# Patient Record
Sex: MALE | Race: WHITE | NOT HISPANIC OR LATINO | Employment: FULL TIME | URBAN - METROPOLITAN AREA
[De-identification: names, ages, dates, MRNs, and addresses within clinical notes are randomized per-mention and may not be internally consistent; named-entity substitution may affect disease eponyms.]

---

## 2018-10-23 ENCOUNTER — IMMUNIZATION (OUTPATIENT)
Dept: SOCIAL WORK | Facility: CLINIC | Age: 53
End: 2018-10-23
Payer: COMMERCIAL

## 2018-10-23 DIAGNOSIS — Z23 NEED FOR VACCINATION: ICD-10-CM

## 2018-10-23 PROCEDURE — 90471 IMMUNIZATION ADMIN: CPT | Performed by: REGISTERED NURSE

## 2018-10-23 PROCEDURE — 90686 IIV4 VACC NO PRSV 0.5 ML IM: CPT | Performed by: REGISTERED NURSE

## 2020-12-17 DIAGNOSIS — Z23 NEED FOR VACCINATION: ICD-10-CM

## 2020-12-20 ENCOUNTER — APPOINTMENT (OUTPATIENT)
Dept: FAMILY PLANNING/WOMEN'S HEALTH CLINIC | Facility: IMMUNIZATION CENTER | Age: 55
End: 2020-12-20
Attending: FAMILY MEDICINE
Payer: COMMERCIAL

## 2020-12-20 DIAGNOSIS — Z23 NEED FOR VACCINATION: ICD-10-CM

## 2020-12-20 PROCEDURE — 0001A PFIZER SARS-COV-2 VACCINE: CPT

## 2020-12-20 PROCEDURE — 91300 PFIZER SARS-COV-2 VACCINE: CPT

## 2020-12-21 ENCOUNTER — IMMUNIZATION (OUTPATIENT)
Dept: FAMILY PLANNING/WOMEN'S HEALTH CLINIC | Facility: IMMUNIZATION CENTER | Age: 55
End: 2020-12-21
Payer: COMMERCIAL

## 2020-12-21 DIAGNOSIS — Z23 ENCOUNTER FOR VACCINATION: Primary | ICD-10-CM

## 2021-01-10 ENCOUNTER — IMMUNIZATION (OUTPATIENT)
Dept: FAMILY PLANNING/WOMEN'S HEALTH CLINIC | Facility: IMMUNIZATION CENTER | Age: 56
End: 2021-01-10
Attending: FAMILY MEDICINE
Payer: COMMERCIAL

## 2021-01-10 DIAGNOSIS — Z23 ENCOUNTER FOR VACCINATION: Primary | ICD-10-CM

## 2021-01-10 PROCEDURE — 0002A PFIZER SARS-COV-2 VACCINE: CPT

## 2021-01-10 PROCEDURE — 91300 PFIZER SARS-COV-2 VACCINE: CPT

## 2021-06-21 ENCOUNTER — APPOINTMENT (OUTPATIENT)
Dept: SLEEP MEDICINE | Facility: MEDICAL CENTER | Age: 56
End: 2021-06-21
Payer: COMMERCIAL

## 2022-09-06 ENCOUNTER — APPOINTMENT (RX ONLY)
Dept: URBAN - METROPOLITAN AREA CLINIC 4 | Facility: CLINIC | Age: 57
Setting detail: DERMATOLOGY
End: 2022-09-06

## 2022-09-06 DIAGNOSIS — L57.0 ACTINIC KERATOSIS: ICD-10-CM

## 2022-09-06 DIAGNOSIS — L81.4 OTHER MELANIN HYPERPIGMENTATION: ICD-10-CM

## 2022-09-06 PROBLEM — D48.5 NEOPLASM OF UNCERTAIN BEHAVIOR OF SKIN: Status: ACTIVE | Noted: 2022-09-06

## 2022-09-06 PROCEDURE — ? BIOPSY BY SHAVE METHOD

## 2022-09-06 PROCEDURE — 11103 TANGNTL BX SKIN EA SEP/ADDL: CPT

## 2022-09-06 PROCEDURE — 11102 TANGNTL BX SKIN SINGLE LES: CPT

## 2022-09-06 PROCEDURE — ? ORDER FOR PHOTODYNAMIC THERAPY

## 2022-09-06 PROCEDURE — 99203 OFFICE O/P NEW LOW 30 MIN: CPT | Mod: 25

## 2022-09-06 PROCEDURE — ? COUNSELING

## 2022-09-06 ASSESSMENT — LOCATION ZONE DERM
LOCATION ZONE: FACE
LOCATION ZONE: ARM
LOCATION ZONE: FACE

## 2022-09-06 ASSESSMENT — LOCATION DETAILED DESCRIPTION DERM
LOCATION DETAILED: RIGHT ANTERIOR PROXIMAL UPPER ARM
LOCATION DETAILED: RIGHT PROXIMAL POSTERIOR UPPER ARM
LOCATION DETAILED: LEFT SUPERIOR LATERAL MALAR CHEEK
LOCATION DETAILED: RIGHT CENTRAL MALAR CHEEK
LOCATION DETAILED: LEFT PROXIMAL POSTERIOR UPPER ARM
LOCATION DETAILED: LEFT ANTERIOR PROXIMAL UPPER ARM

## 2022-09-06 ASSESSMENT — LOCATION SIMPLE DESCRIPTION DERM
LOCATION SIMPLE: RIGHT CHEEK
LOCATION SIMPLE: LEFT UPPER ARM
LOCATION SIMPLE: LEFT CHEEK
LOCATION SIMPLE: RIGHT UPPER ARM

## 2022-09-06 NOTE — PROCEDURE: ORDER FOR PHOTODYNAMIC THERAPY
Back Incubation Time: 2 Hours
Face And Ears Incubation Time: 1 Hour
Debridement: No
Photosensitizer: Ameluz
Pdt Type: Red Light
Location: Face
Detail Level: Simple
Consent: The procedure and risks were reviewed with the patient including but not limited to: burning, pigmentary changes, pain, blistering, scabbing, redness, and the possibility of needing numerous treatments. Strict photoprotection after the procedure was also discussed.
Face And Scalp Incubation Time: 1 Hour for the face and 2 Hours for the scalp
Frequency Of Pdt: Single Treatment
History Of Hsv (Optional): Yes

## 2022-09-06 NOTE — PROCEDURE: COUNSELING
Detail Level: Zone
Sunscreen Recommendations: Recommend sunscreen daily, or sun protective clothing.  I recommend a zinc or titanium based sunscreen with a spf of 30 or greater.

## 2022-09-22 ENCOUNTER — APPOINTMENT (RX ONLY)
Dept: URBAN - METROPOLITAN AREA CLINIC 4 | Facility: CLINIC | Age: 57
Setting detail: DERMATOLOGY
End: 2022-09-22

## 2022-09-22 ENCOUNTER — PRE-ADMISSION TESTING (OUTPATIENT)
Dept: ADMISSIONS | Facility: MEDICAL CENTER | Age: 57
End: 2022-09-22
Attending: SURGERY
Payer: COMMERCIAL

## 2022-09-22 DIAGNOSIS — Z01.812 PRE-OPERATIVE LABORATORY EXAMINATION: ICD-10-CM

## 2022-09-22 PROBLEM — C44.719 BASAL CELL CARCINOMA OF SKIN OF LEFT LOWER LIMB, INCLUDING HIP: Status: ACTIVE | Noted: 2022-09-22

## 2022-09-22 PROCEDURE — 12032 INTMD RPR S/A/T/EXT 2.6-7.5: CPT

## 2022-09-22 PROCEDURE — ? EXCISION

## 2022-09-22 PROCEDURE — ? COUNSELING

## 2022-09-22 PROCEDURE — ? ADDITIONAL NOTES

## 2022-09-22 PROCEDURE — 11602 EXC TR-EXT MAL+MARG 1.1-2 CM: CPT

## 2022-09-22 RX ORDER — LISINOPRIL AND HYDROCHLOROTHIAZIDE 12.5; 1 MG/1; MG/1
1 TABLET ORAL EVERY MORNING
COMMUNITY

## 2022-09-22 RX ORDER — TRAVOPROST OPHTHALMIC SOLUTION 0.04 MG/ML
1 SOLUTION OPHTHALMIC DAILY
COMMUNITY

## 2022-09-22 NOTE — PROCEDURE: EXCISION

## 2022-09-22 NOTE — HPI: SKIN LESION (BASAL CELL CARCINOMA)
How Severe Is Your Skin Cancer?: mild
Is This A New Presentation, Or A Follow-Up?: Follow Up Basal Cell Carcinoma
When Was Basal Cell Biopsied? (Optional): 9/8/22
Accession # (Optional): [id number]

## 2022-09-22 NOTE — PROCEDURE: ADDITIONAL NOTES
Detail Level: Detailed
Additional Notes: We discussed options for treatment and after discussion, patient wanted to proceed with excision.
Render Risk Assessment In Note?: no

## 2022-09-23 ENCOUNTER — PRE-ADMISSION TESTING (OUTPATIENT)
Dept: ADMISSIONS | Facility: MEDICAL CENTER | Age: 57
End: 2022-09-23
Attending: SURGERY
Payer: COMMERCIAL

## 2022-09-23 DIAGNOSIS — Z01.812 PRE-OPERATIVE LABORATORY EXAMINATION: ICD-10-CM

## 2022-09-23 LAB
ALBUMIN SERPL BCP-MCNC: 4.7 G/DL (ref 3.2–4.9)
ALBUMIN/GLOB SERPL: 2 G/DL
ALP SERPL-CCNC: 68 U/L (ref 30–99)
ALT SERPL-CCNC: 12 U/L (ref 2–50)
ANION GAP SERPL CALC-SCNC: 10 MMOL/L (ref 7–16)
AST SERPL-CCNC: 16 U/L (ref 12–45)
BASOPHILS # BLD AUTO: 0.4 % (ref 0–1.8)
BASOPHILS # BLD: 0.03 K/UL (ref 0–0.12)
BILIRUB SERPL-MCNC: 1.1 MG/DL (ref 0.1–1.5)
BUN SERPL-MCNC: 17 MG/DL (ref 8–22)
CALCIUM SERPL-MCNC: 9.5 MG/DL (ref 8.5–10.5)
CHLORIDE SERPL-SCNC: 103 MMOL/L (ref 96–112)
CO2 SERPL-SCNC: 25 MMOL/L (ref 20–33)
CREAT SERPL-MCNC: 1.05 MG/DL (ref 0.5–1.4)
EOSINOPHIL # BLD AUTO: 0.24 K/UL (ref 0–0.51)
EOSINOPHIL NFR BLD: 2.9 % (ref 0–6.9)
ERYTHROCYTE [DISTWIDTH] IN BLOOD BY AUTOMATED COUNT: 40.7 FL (ref 35.9–50)
GFR SERPLBLD CREATININE-BSD FMLA CKD-EPI: 82 ML/MIN/1.73 M 2
GLOBULIN SER CALC-MCNC: 2.4 G/DL (ref 1.9–3.5)
GLUCOSE SERPL-MCNC: 85 MG/DL (ref 65–99)
HCT VFR BLD AUTO: 43.1 % (ref 42–52)
HGB BLD-MCNC: 15.1 G/DL (ref 14–18)
IMM GRANULOCYTES # BLD AUTO: 0.03 K/UL (ref 0–0.11)
IMM GRANULOCYTES NFR BLD AUTO: 0.4 % (ref 0–0.9)
LYMPHOCYTES # BLD AUTO: 2.82 K/UL (ref 1–4.8)
LYMPHOCYTES NFR BLD: 33.7 % (ref 22–41)
MCH RBC QN AUTO: 32.1 PG (ref 27–33)
MCHC RBC AUTO-ENTMCNC: 35 G/DL (ref 33.7–35.3)
MCV RBC AUTO: 91.7 FL (ref 81.4–97.8)
MONOCYTES # BLD AUTO: 0.54 K/UL (ref 0–0.85)
MONOCYTES NFR BLD AUTO: 6.4 % (ref 0–13.4)
NEUTROPHILS # BLD AUTO: 4.72 K/UL (ref 1.82–7.42)
NEUTROPHILS NFR BLD: 56.2 % (ref 44–72)
NRBC # BLD AUTO: 0 K/UL
NRBC BLD-RTO: 0 /100 WBC
PLATELET # BLD AUTO: 255 K/UL (ref 164–446)
PMV BLD AUTO: 9.9 FL (ref 9–12.9)
POTASSIUM SERPL-SCNC: 3.9 MMOL/L (ref 3.6–5.5)
PROT SERPL-MCNC: 7.1 G/DL (ref 6–8.2)
RBC # BLD AUTO: 4.7 M/UL (ref 4.7–6.1)
SODIUM SERPL-SCNC: 138 MMOL/L (ref 135–145)
WBC # BLD AUTO: 8.4 K/UL (ref 4.8–10.8)

## 2022-09-23 PROCEDURE — 36415 COLL VENOUS BLD VENIPUNCTURE: CPT

## 2022-09-23 PROCEDURE — 85025 COMPLETE CBC W/AUTO DIFF WBC: CPT

## 2022-09-23 PROCEDURE — 80053 COMPREHEN METABOLIC PANEL: CPT

## 2022-09-26 ENCOUNTER — HOSPITAL ENCOUNTER (OUTPATIENT)
Facility: MEDICAL CENTER | Age: 57
End: 2022-09-26
Attending: SURGERY | Admitting: SURGERY
Payer: COMMERCIAL

## 2022-09-26 ENCOUNTER — ANESTHESIA (OUTPATIENT)
Dept: SURGERY | Facility: MEDICAL CENTER | Age: 57
End: 2022-09-26
Payer: COMMERCIAL

## 2022-09-26 ENCOUNTER — ANESTHESIA EVENT (OUTPATIENT)
Dept: SURGERY | Facility: MEDICAL CENTER | Age: 57
End: 2022-09-26
Payer: COMMERCIAL

## 2022-09-26 VITALS
HEIGHT: 73 IN | TEMPERATURE: 96.9 F | DIASTOLIC BLOOD PRESSURE: 90 MMHG | RESPIRATION RATE: 18 BRPM | WEIGHT: 256.84 LBS | SYSTOLIC BLOOD PRESSURE: 156 MMHG | BODY MASS INDEX: 34.04 KG/M2 | OXYGEN SATURATION: 97 % | HEART RATE: 47 BPM

## 2022-09-26 DIAGNOSIS — G89.18 POSTOPERATIVE PAIN: ICD-10-CM

## 2022-09-26 PROBLEM — C43.59 MELANOMA OF BACK (HCC): Status: ACTIVE | Noted: 2022-09-26

## 2022-09-26 LAB — EKG IMPRESSION: NORMAL

## 2022-09-26 PROCEDURE — 160036 HCHG PACU - EA ADDL 30 MINS PHASE I: Performed by: SURGERY

## 2022-09-26 PROCEDURE — 700101 HCHG RX REV CODE 250: Performed by: ANESTHESIOLOGY

## 2022-09-26 PROCEDURE — 700105 HCHG RX REV CODE 258: Performed by: SURGERY

## 2022-09-26 PROCEDURE — 700101 HCHG RX REV CODE 250: Performed by: SURGERY

## 2022-09-26 PROCEDURE — 93005 ELECTROCARDIOGRAM TRACING: CPT | Performed by: SURGERY

## 2022-09-26 PROCEDURE — A9270 NON-COVERED ITEM OR SERVICE: HCPCS | Performed by: ANESTHESIOLOGY

## 2022-09-26 PROCEDURE — 160009 HCHG ANES TIME/MIN: Performed by: SURGERY

## 2022-09-26 PROCEDURE — 700111 HCHG RX REV CODE 636 W/ 250 OVERRIDE (IP)

## 2022-09-26 PROCEDURE — 88342 IMHCHEM/IMCYTCHM 1ST ANTB: CPT

## 2022-09-26 PROCEDURE — 93010 ELECTROCARDIOGRAM REPORT: CPT | Performed by: INTERNAL MEDICINE

## 2022-09-26 PROCEDURE — 88305 TISSUE EXAM BY PATHOLOGIST: CPT

## 2022-09-26 PROCEDURE — 00300 ANES ALL PX INTEG H/N/PTRUNK: CPT | Performed by: ANESTHESIOLOGY

## 2022-09-26 PROCEDURE — 160046 HCHG PACU - 1ST 60 MINS PHASE II: Performed by: SURGERY

## 2022-09-26 PROCEDURE — 700111 HCHG RX REV CODE 636 W/ 250 OVERRIDE (IP): Performed by: ANESTHESIOLOGY

## 2022-09-26 PROCEDURE — 160028 HCHG SURGERY MINUTES - 1ST 30 MINS LEVEL 3: Performed by: SURGERY

## 2022-09-26 PROCEDURE — 160002 HCHG RECOVERY MINUTES (STAT): Performed by: SURGERY

## 2022-09-26 PROCEDURE — 160025 RECOVERY II MINUTES (STATS): Performed by: SURGERY

## 2022-09-26 PROCEDURE — 700102 HCHG RX REV CODE 250 W/ 637 OVERRIDE(OP): Performed by: ANESTHESIOLOGY

## 2022-09-26 PROCEDURE — 160039 HCHG SURGERY MINUTES - EA ADDL 1 MIN LEVEL 3: Performed by: SURGERY

## 2022-09-26 PROCEDURE — 160035 HCHG PACU - 1ST 60 MINS PHASE I: Performed by: SURGERY

## 2022-09-26 PROCEDURE — 160048 HCHG OR STATISTICAL LEVEL 1-5: Performed by: SURGERY

## 2022-09-26 RX ORDER — TRAMADOL HYDROCHLORIDE 50 MG/1
50 TABLET ORAL EVERY 4 HOURS PRN
Qty: 12 TABLET | Refills: 0 | Status: SHIPPED | OUTPATIENT
Start: 2022-09-26 | End: 2022-09-29

## 2022-09-26 RX ORDER — HYDROMORPHONE HYDROCHLORIDE 1 MG/ML
0.2 INJECTION, SOLUTION INTRAMUSCULAR; INTRAVENOUS; SUBCUTANEOUS
Status: DISCONTINUED | OUTPATIENT
Start: 2022-09-26 | End: 2022-09-26 | Stop reason: HOSPADM

## 2022-09-26 RX ORDER — HALOPERIDOL 5 MG/ML
1 INJECTION INTRAMUSCULAR
Status: DISCONTINUED | OUTPATIENT
Start: 2022-09-26 | End: 2022-09-26 | Stop reason: HOSPADM

## 2022-09-26 RX ORDER — LIDOCAINE HYDROCHLORIDE 10 MG/ML
INJECTION, SOLUTION EPIDURAL; INFILTRATION; INTRACAUDAL; PERINEURAL
Status: COMPLETED
Start: 2022-09-26 | End: 2022-09-26

## 2022-09-26 RX ORDER — OXYCODONE HCL 5 MG/5 ML
10 SOLUTION, ORAL ORAL
Status: COMPLETED | OUTPATIENT
Start: 2022-09-26 | End: 2022-09-26

## 2022-09-26 RX ORDER — CELECOXIB 200 MG/1
400 CAPSULE ORAL ONCE
Status: COMPLETED | OUTPATIENT
Start: 2022-09-26 | End: 2022-09-26

## 2022-09-26 RX ORDER — ALBUTEROL SULFATE 2.5 MG/3ML
2.5 SOLUTION RESPIRATORY (INHALATION)
Status: DISCONTINUED | OUTPATIENT
Start: 2022-09-26 | End: 2022-09-26 | Stop reason: HOSPADM

## 2022-09-26 RX ORDER — MIDAZOLAM HYDROCHLORIDE 1 MG/ML
INJECTION INTRAMUSCULAR; INTRAVENOUS PRN
Status: DISCONTINUED | OUTPATIENT
Start: 2022-09-26 | End: 2022-09-26 | Stop reason: SURG

## 2022-09-26 RX ORDER — HYDROMORPHONE HYDROCHLORIDE 1 MG/ML
0.1 INJECTION, SOLUTION INTRAMUSCULAR; INTRAVENOUS; SUBCUTANEOUS
Status: DISCONTINUED | OUTPATIENT
Start: 2022-09-26 | End: 2022-09-26 | Stop reason: HOSPADM

## 2022-09-26 RX ORDER — LABETALOL HYDROCHLORIDE 5 MG/ML
5 INJECTION, SOLUTION INTRAVENOUS
Status: DISCONTINUED | OUTPATIENT
Start: 2022-09-26 | End: 2022-09-26 | Stop reason: HOSPADM

## 2022-09-26 RX ORDER — CEFAZOLIN SODIUM 1 G/3ML
INJECTION, POWDER, FOR SOLUTION INTRAMUSCULAR; INTRAVENOUS PRN
Status: DISCONTINUED | OUTPATIENT
Start: 2022-09-26 | End: 2022-09-26 | Stop reason: SURG

## 2022-09-26 RX ORDER — OXYCODONE HCL 5 MG/5 ML
5 SOLUTION, ORAL ORAL
Status: COMPLETED | OUTPATIENT
Start: 2022-09-26 | End: 2022-09-26

## 2022-09-26 RX ORDER — DEXAMETHASONE SODIUM PHOSPHATE 4 MG/ML
INJECTION, SOLUTION INTRA-ARTICULAR; INTRALESIONAL; INTRAMUSCULAR; INTRAVENOUS; SOFT TISSUE PRN
Status: DISCONTINUED | OUTPATIENT
Start: 2022-09-26 | End: 2022-09-26 | Stop reason: SURG

## 2022-09-26 RX ORDER — ACETAMINOPHEN 500 MG
1000 TABLET ORAL ONCE
Status: COMPLETED | OUTPATIENT
Start: 2022-09-26 | End: 2022-09-26

## 2022-09-26 RX ORDER — MAGNESIUM HYDROXIDE 1200 MG/15ML
LIQUID ORAL
Status: COMPLETED | OUTPATIENT
Start: 2022-09-26 | End: 2022-09-26

## 2022-09-26 RX ORDER — ONDANSETRON 2 MG/ML
INJECTION INTRAMUSCULAR; INTRAVENOUS PRN
Status: DISCONTINUED | OUTPATIENT
Start: 2022-09-26 | End: 2022-09-26 | Stop reason: SURG

## 2022-09-26 RX ORDER — DIPHENHYDRAMINE HYDROCHLORIDE 50 MG/ML
12.5 INJECTION INTRAMUSCULAR; INTRAVENOUS
Status: DISCONTINUED | OUTPATIENT
Start: 2022-09-26 | End: 2022-09-26 | Stop reason: HOSPADM

## 2022-09-26 RX ORDER — HYDROMORPHONE HYDROCHLORIDE 1 MG/ML
0.4 INJECTION, SOLUTION INTRAMUSCULAR; INTRAVENOUS; SUBCUTANEOUS
Status: DISCONTINUED | OUTPATIENT
Start: 2022-09-26 | End: 2022-09-26 | Stop reason: HOSPADM

## 2022-09-26 RX ORDER — SODIUM CHLORIDE, SODIUM LACTATE, POTASSIUM CHLORIDE, CALCIUM CHLORIDE 600; 310; 30; 20 MG/100ML; MG/100ML; MG/100ML; MG/100ML
INJECTION, SOLUTION INTRAVENOUS CONTINUOUS
Status: DISCONTINUED | OUTPATIENT
Start: 2022-09-26 | End: 2022-09-26 | Stop reason: HOSPADM

## 2022-09-26 RX ORDER — LIDOCAINE HYDROCHLORIDE 20 MG/ML
INJECTION, SOLUTION EPIDURAL; INFILTRATION; INTRACAUDAL; PERINEURAL PRN
Status: DISCONTINUED | OUTPATIENT
Start: 2022-09-26 | End: 2022-09-26 | Stop reason: SURG

## 2022-09-26 RX ORDER — BUPIVACAINE HYDROCHLORIDE AND EPINEPHRINE 5; 5 MG/ML; UG/ML
INJECTION, SOLUTION EPIDURAL; INTRACAUDAL; PERINEURAL
Status: DISCONTINUED | OUTPATIENT
Start: 2022-09-26 | End: 2022-09-26 | Stop reason: HOSPADM

## 2022-09-26 RX ORDER — ROCURONIUM BROMIDE 10 MG/ML
INJECTION, SOLUTION INTRAVENOUS PRN
Status: DISCONTINUED | OUTPATIENT
Start: 2022-09-26 | End: 2022-09-26 | Stop reason: SURG

## 2022-09-26 RX ORDER — ONDANSETRON 2 MG/ML
4 INJECTION INTRAMUSCULAR; INTRAVENOUS
Status: DISCONTINUED | OUTPATIENT
Start: 2022-09-26 | End: 2022-09-26 | Stop reason: HOSPADM

## 2022-09-26 RX ADMIN — LIDOCAINE HYDROCHLORIDE 100 MG: 20 INJECTION, SOLUTION EPIDURAL; INFILTRATION; INTRACAUDAL at 08:58

## 2022-09-26 RX ADMIN — DEXAMETHASONE SODIUM PHOSPHATE 8 MG: 4 INJECTION, SOLUTION INTRA-ARTICULAR; INTRALESIONAL; INTRAMUSCULAR; INTRAVENOUS; SOFT TISSUE at 08:58

## 2022-09-26 RX ADMIN — OXYCODONE HYDROCHLORIDE 10 MG: 5 SOLUTION ORAL at 10:20

## 2022-09-26 RX ADMIN — MIDAZOLAM HYDROCHLORIDE 2 MG: 1 INJECTION, SOLUTION INTRAMUSCULAR; INTRAVENOUS at 08:51

## 2022-09-26 RX ADMIN — PROPOFOL 160 MG: 10 INJECTION, EMULSION INTRAVENOUS at 08:58

## 2022-09-26 RX ADMIN — CELECOXIB 400 MG: 200 CAPSULE ORAL at 08:30

## 2022-09-26 RX ADMIN — ACETAMINOPHEN 1000 MG: 500 TABLET ORAL at 08:30

## 2022-09-26 RX ADMIN — EPHEDRINE SULFATE 10 MG: 50 INJECTION INTRAMUSCULAR; INTRAVENOUS; SUBCUTANEOUS at 09:42

## 2022-09-26 RX ADMIN — FENTANYL CITRATE 50 MCG: 50 INJECTION, SOLUTION INTRAMUSCULAR; INTRAVENOUS at 09:08

## 2022-09-26 RX ADMIN — EPHEDRINE SULFATE 10 MG: 50 INJECTION INTRAMUSCULAR; INTRAVENOUS; SUBCUTANEOUS at 09:23

## 2022-09-26 RX ADMIN — SODIUM CHLORIDE, POTASSIUM CHLORIDE, SODIUM LACTATE AND CALCIUM CHLORIDE: 600; 310; 30; 20 INJECTION, SOLUTION INTRAVENOUS at 08:32

## 2022-09-26 RX ADMIN — ONDANSETRON 4 MG: 2 INJECTION INTRAMUSCULAR; INTRAVENOUS at 08:58

## 2022-09-26 RX ADMIN — CEFAZOLIN 2 G: 330 INJECTION, POWDER, FOR SOLUTION INTRAMUSCULAR; INTRAVENOUS at 09:02

## 2022-09-26 RX ADMIN — LIDOCAINE HYDROCHLORIDE 0.5 ML: 10 INJECTION, SOLUTION EPIDURAL; INFILTRATION; INTRACAUDAL; PERINEURAL at 08:28

## 2022-09-26 RX ADMIN — ROCURONIUM BROMIDE 50 MG: 10 INJECTION, SOLUTION INTRAVENOUS at 08:58

## 2022-09-26 RX ADMIN — FENTANYL CITRATE 50 MCG: 50 INJECTION, SOLUTION INTRAMUSCULAR; INTRAVENOUS at 09:49

## 2022-09-26 RX ADMIN — SUGAMMADEX 200 MG: 100 INJECTION, SOLUTION INTRAVENOUS at 09:46

## 2022-09-26 RX ADMIN — SODIUM CHLORIDE, POTASSIUM CHLORIDE, SODIUM LACTATE AND CALCIUM CHLORIDE: 600; 310; 30; 20 INJECTION, SOLUTION INTRAVENOUS at 08:54

## 2022-09-26 RX ADMIN — Medication 0.5 ML: at 08:28

## 2022-09-26 ASSESSMENT — FIBROSIS 4 INDEX: FIB4 SCORE: 1.03

## 2022-09-26 ASSESSMENT — PAIN DESCRIPTION - PAIN TYPE
TYPE: SURGICAL PAIN
TYPE: SURGICAL PAIN

## 2022-09-26 ASSESSMENT — PAIN SCALES - GENERAL: PAIN_LEVEL: 3

## 2022-09-26 NOTE — OP REPORT
DATE OF SERVICE:  09/26/2022     SURGEON:  Fidel Archer MD     ANESTHESIOLOGIST:  Yoselyn Cameron MD     TYPE OF ANESTHETIC:  General endotracheal anesthesia plus local 0.5% Marcaine   with epinephrine, 30 mL used.     PREOPERATIVE DIAGNOSIS:  Malignant melanoma of the left upper back, clinical   stage I.     POSTOPERATIVE DIAGNOSIS:  Malignant melanoma of the left upper back, clinical   stage I.     PROCEDURE:  Radical wide excision of malignant melanoma of the left upper back   with layered closure.     FINDINGS:  The patient is a 56-year-old gentleman who presents with a recent   finding of a mole that was changing on the left upper back region near the   scapula.  A biopsy was performed by dermatology and revealed a malignant   melanoma that was 0.5 mm in depth.  The margins both the peripheral and deep   were involved; however, there was no residual tumor noted.  Options and NCCN   guidelines were reviewed and the patient elected to proceed with a radical   wide excision of the melanoma.  Current recommendations for this level of the   melanoma was that a sentinel node not be performed unless the final depth was   greater than 0.8-1 mm.  Today, the patient underwent a radical wide excision   of the melanoma with 1 cm minimal margin.  An elliptical incision was made   around the 1 cm minimal margin; therefore, an additional 4 cm, both medial and   lateral was obtained, going towards the shweta basins.  There were no apparent   complications.     FINDING AND PROCEDURE:  The patient was brought to the operating room and   placed on the table in supine position.  General anesthetic was then provided   by the anesthesiologist.  The patient was turned to the left side up, right   side down position on a beanbag on the operating table.  An axillary roll was   placed.  All superficial nerves and bony prominences were carefully padded.    The arms were supported on the arm with an airplane device and all areas of    the arm were padded carefully.  The patient was secured to the table.  A   timeout was called.  The correct patient, correct diagnosis, correct surgery,   and correct location of surgery were discussed and agreed upon.  He did   receive preoperative IV antibiotics.  The left upper back area was prepped and   draped in usual sterile fashion.  Using a ruler, a 1 cm margin was marked   around the lateral most aspect of the previous shave biopsy site in the left   upper back.  Then, an elliptical incision was designed around this 1 cm   minimal margin by extending it superior medially approximately 5 cm and   inferior laterally approximately 5 cm.  This elliptical incision was designed   around the primary site.  Incision was made through the skin and dermis with   #15 blade.  All bleeding was controlled with electrocautery.  Dissection was   carried down through the generous subcutaneous tissue all the way down until   the fascia of the latissimus dorsi muscle was encountered.  This specimen was   then dissected off of the fascia of the latissimus dorsi muscle including a   portion of the fascia in the midsection.  The entire specimen, which included   the fascia of the overlying subcutaneous tissue, dermis and epidermis and the   lesion in the center of the specimen was then removed and sent to pathology   for further characterization.  The specimen was oriented with sutures for the   pathologist.  The wound was irrigated with saline and then the remaining   tissue was elevated off the distal dorsi muscle circumferentially for 3-4 cm   in all directions using electrocautery device.  This dissection provided   enough laxity in the flaps to rotate the flaps in to fill the defect.  The   wound was then irrigated with saline and then 3-0 Vicryl suture was used in   interrupted fashion for the subcutaneous tissue and skin was closed using   interrupted sutures of 3-0 nylon also interrupted fashion.  Steri-Strips and    sterile dressings were applied.  The patient did tolerate the procedure well   with no complication.  Final sponge and needle count were correct.  He was   then returned to the supine position and then transferred to the recovery room   bed without apparent complications.  Final sponge and needle count were   correct.        ______________________________  MD ANTWAN BROWN/JUAN F/ZI    DD:  09/26/2022 10:03  DT:  09/26/2022 10:37    Job#:  [id number]    CC:Felix Abbasi MD

## 2022-09-26 NOTE — ANESTHESIA PREPROCEDURE EVALUATION
Case: [id number] Date/Time: 09/26/22 0845    Procedures:       RADICAL WIDE EXCISION OF MALIGNANT MELANOMA OF LEFT UPPER BACK WITH LAYERED CLOSURE      CLOSURE, WOUND    Pre-op diagnosis: MALIGNANT MELANOMA OF BACK    Location: [facility] OR 15 / SURGERY [facility]    Surgeons: Fidel Archer M.D.        56yoM with HTN, AMPARO     NKDA  NPO  No AC    Relevant Problems   No relevant active problems       Physical Exam    Airway   Mallampati: II       Cardiovascular - normal exam     Dental - normal exam           Pulmonary - normal exam     Abdominal - normal exam     Neurological - normal exam                 Anesthesia Plan    ASA 2       Plan - general       Airway plan will be ETT          Induction: intravenous    Postoperative Plan: Postoperative administration of opioids is intended.    Pertinent diagnostic labs and testing reviewed    Informed Consent:    Anesthetic plan and risks discussed with patient.

## 2022-09-26 NOTE — ANESTHESIA PROCEDURE NOTES
Airway    Date/Time: 9/26/2022 8:59 AM  Performed by: Yoselyn Cameron M.D.  Authorized by: oYselyn Cameron M.D.     Location:  OR  Urgency:  Elective  Indications for Airway Management:  Anesthesia      Spontaneous Ventilation: absent    Sedation Level:  Deep  Preoxygenated: Yes    Patient Position:  Sniffing  Mask Difficulty Assessment:  1 - vent by mask  Final Airway Type:  Endotracheal airway  Final Endotracheal Airway:  ETT  Cuffed: Yes    Technique Used for Successful ETT Placement:  Video laryngoscopy  Devices/Methods Used in Placement:  Intubating stylet    Blade Type:  Glide  Laryngoscope Blade/Videolaryngoscope Blade Size:  4  ETT Size (mm):  7.5  Measured from:  Lips  ETT to Lips (cm):  21  Placement Verified by: capnometry    Cormack-Lehane Classification:  Grade I - full view of glottis  Number of Attempts at Approach:  1

## 2022-09-26 NOTE — OP REPORT
DATE OF SERVICE:  09/26/2022     SURGEON:  Fidel Archer MD     ANESTHESIOLOGIST:  Yoselyn Cameron MD     TYPE OF ANESTHETIC:  General endotracheal anesthesia plus local 0.5% Marcaine   with epinephrine, 30 mL used.     PREOPERATIVE DIAGNOSIS:  Malignant melanoma of the left upper back, clinical   stage I.     POSTOPERATIVE DIAGNOSIS:  Malignant melanoma of the left upper back, clinical   stage I.     PROCEDURE:  Radical wide excision of malignant melanoma of the left upper back   with layered closure.     FINDINGS:  The patient is a 56-year-old gentleman who presents with a recent   finding of a mole that was changing on the left upper back region near the   scapula.  A biopsy was performed by dermatology and revealed a malignant   melanoma that was 0.5 mm in depth.  The margins for both the peripheral and deep   were involved; however, there was no visible residual tumor noted.  Options and NCCN   guidelines were reviewed and the patient elected to proceed with a radical   wide excision of the melanoma.  Current recommendations for this level of the   melanoma was that a sentinel node not be performed unless the final depth was   greater than 0.8-1 mm.  Today, the patient underwent a radical wide excision   of the melanoma with 1 cm minimal margin.  An elliptical incision was made   around the 1 cm minimal margin; therefore, an additional 4 cm, both medial and   lateral was obtained, going towards the shweta basins.  There were no apparent   complications.     FINDING AND PROCEDURE:  The patient was brought to the operating room and   placed on the table in supine position.  General anesthetic was then provided   by the anesthesiologist.  The patient was turned to the left side up, right   side down position on a beanbag on the operating table.  An axillary roll was   placed.  All superficial nerves and bony prominences were carefully padded.    The arms were supported with an airplane device and all areas of    the arms were padded carefully.  The patient was secured to the table.  A   timeout was called.  The correct patient, correct diagnosis, correct surgery,   and correct location of surgery were discussed and agreed upon.  He did   receive preoperative IV antibiotics.  The left upper back area was prepped and   draped in usual sterile fashion.  Using a ruler, a 1 cm margin was marked   around the lateral most aspect of the previous shave biopsy site in the left   upper back.  Then, an elliptical incision was designed around this 1 cm   minimal margin by extending it superior medially approximately 5 cm and   inferior laterally approximately 5 cm.  This elliptical incision was designed   around the primary site.  Incision was made through the skin and dermis with   #15 blade.  All bleeding was controlled with electrocautery.  Dissection was   carried down through the generous subcutaneous tissue all the way down until   the fascia of the latissimus dorsi muscle was encountered.  This specimen was   then dissected off of the fascia of the latissimus dorsi muscle including a   portion of the fascia in the midsection.  The entire specimen, which included   the fascia of the overlying subcutaneous tissue, dermis and epidermis and the   lesion in the center of the specimen was then removed and sent to pathology   for further characterization.  The specimen was oriented with sutures for the   pathologist.  The wound was irrigated with saline and then the remaining   tissue was elevated off the latissimus dorsi muscle circumferentially for 3-4 cm   in all directions using electrocautery device.  This dissection provided   enough laxity in the flaps to rotate the flaps in to fill the defect.  The   wound was then irrigated with saline and then 3-0 Vicryl suture was used in   interrupted fashion for the subcutaneous tissue and skin was closed using   interrupted sutures of 3-0 nylon also interrupted fashion.  Steri-Strips and    sterile dressings were applied.  The patient did tolerate the procedure well   with no complication.  Final sponge and needle count were correct.  He was   then returned to the supine position and then transferred to the recovery room   bed without apparent complications.  Final sponge and needle count were   correct.        ______________________________  MD ANTWAN BROWN/JUAN F/ZI    DD:  09/26/2022 10:03  DT:  09/26/2022 10:37    Job#:  [id number]    CC:Felix Abbasi MD

## 2022-09-26 NOTE — OR NURSING
Patient arrived from PACU via hospital bed. Patient denies pain at this time. Vital signs stable. Spouse at bedside. Education provided to patient and spouse.   Dressing intact, no obvious signs of swelling or drainage, unable to fully observe surgical site d/t dressing, informed patient about steri strips beneath dressing, to stay in place unless otherwise specified.  Small scratch to left cheek, vaseline applied to site from anesthesia tape removal.    Coffee and apple juice provided to patient, ice pack at bedside for pain relief.    Patient states he is ready to begin the process of discharge.     Paperwork signed and patient to get dressed with spouse assistance.     Patient wheeled to car via wheelchair and RN.

## 2022-09-26 NOTE — ANESTHESIA POSTPROCEDURE EVALUATION
Patient: Jayjay Anders    Procedure Summary     Date: 09/26/22 Room / Location: [place] [address] / SURGERY [facility]    Anesthesia Start: 0854 Anesthesia Stop: 0957    Procedures:       RADICAL WIDE EXCISION OF MALIGNANT MELANOMA OF LEFT UPPER BACK WITH LAYERED CLOSURE (Left: Back)      CLOSURE, WOUND (Left: Back) Diagnosis: (MALIGNANT MELANOMA OF LEFT UPPER BACK)    Surgeons: Fidel Archer M.D. Responsible Provider: Yoselyn Cameron M.D.    Anesthesia Type: general ASA Status: 2          Final Anesthesia Type: general  Last vitals  BP   Blood Pressure: (!) 181/84    Temp   36.6 °C (97.8 °F)    Pulse   65   Resp   15    SpO2   94 %      Anesthesia Post Evaluation    Patient location during evaluation: PACU  Patient participation: complete - patient participated  Level of consciousness: awake and alert  Pain score: 3    Airway patency: patent  Anesthetic complications: no  Cardiovascular status: adequate and hemodynamically stable  Respiratory status: acceptable  Hydration status: acceptable    PONV: none          No notable events documented.     Nurse Pain Score: 3 (NPRS)

## 2022-09-26 NOTE — OR NURSING
Patient AAOx4, calm with c/o left upper back soreness post op. Medicated per MAR. Patient resting comfortably, no s/s discomfort. VSS, afebrile, room air 95% breathing even and unlabored. Left upper back surgical dressing CDI, ice applied. Tolerating water, no nausea. Wife updated on status and plan of care.

## 2022-09-26 NOTE — OR NURSING
Assume care for pt in pre-op. Patient allergies and NPO status verified. Belongings secured. Patient verbalizes understanding of pain scale, expected course of stay and plan of care. Surgical site verified with patient. IV access established. Ekg results in chart. Pt had no Covid symptoms or exposure in the past 21 days, no covid test needed at this time. Call light within reach. No further needs at this time. Hourly rounding in place.

## 2022-09-26 NOTE — ANESTHESIA TIME REPORT
Anesthesia Start and Stop Event Times     Date Time Event    9/26/2022 0827 Ready for Procedure     0854 Anesthesia Start     0957 Anesthesia Stop        Responsible Staff  09/26/22    Name Role Begin End    Yoselyn Cameron M.D. Anesth 0854 0987        Overtime Reason:  no overtime (within assigned shift)    Comments:

## 2022-09-26 NOTE — DISCHARGE INSTRUCTIONS
HOME CARE INSTRUCTIONS    ACTIVITY: Rest and take it easy for the first 24 hours.  A responsible adult is recommended to remain with you during that time.  It is normal to feel sleepy.  We encourage you to not do anything that requires balance, judgment or coordination.    FOR 24 HOURS DO NOT:  Drive, operate machinery or run household appliances.  Drink beer or alcoholic beverages.  Make important decisions or sign legal documents.    SPECIAL INSTRUCTIONS: Excision of Skin Lesions, Care After  This sheet gives you information about how to care for yourself after your procedure. Your health care provider may also give you more specific instructions. If you have problems or questions, contact your health care provider.  What can I expect after the procedure?  After your procedure, it is common to have pain or discomfort at the excision site.  Follow these instructions at home:  Excision care    Follow instructions from your health care provider about how to take care of your excision site. Make sure you:  Wash your hands with soap and water before and after you change your bandage (dressing). If soap and water are not available, use hand .  Change your dressing as told by your health care provider.  Leave stitches (sutures), skin glue, or adhesive strips in place. These skin closures may need to stay in place for 2 weeks or longer. If adhesive strip edges start to loosen and curl up, you may trim the loose edges. Do not remove adhesive strips completely unless your health care provider tells you to do that.  Check the excision area every day for signs of infection. Watch for:  Redness, swelling, or pain.  Fluid or blood.  Warmth.  Pus or a bad smell.  Keep the site clean, dry, and protected for at least 48 hours.  For bleeding, apply gentle but firm pressure to the area using a folded towel for 20 minutes.  Avoid high-impact exercise and activities until the sutures are removed or the area heals.  General  instructions  Take over-the-counter and prescription medicines only as told by your health care provider.  Follow instructions from your health care provider about how to minimize scarring. Scarring should lessen over time.  Avoid sun exposure until the area has healed. Use sunscreen to protect the area from the sun after it has healed.  Keep all follow-up visits as told by your health care provider. This is important.  Contact a health care provider if:  You have redness, swelling, or pain around your excision site.  You have fluid or blood coming from your excision site.  Your excision site feels warm to the touch.  You have pus or a bad smell coming from your excision site.  You have a fever.  You have pain that does not improve in 2-3 days after your procedure.  You notice skin irregularities or changes in how you feel (sensation).  Summary  This sheet of instructions provides you with information about caring for yourself after your procedure. Contact your health care provider if you have any problems or questions.  Take over-the-counter and prescription medicines only as told by your health care provider.  Change your dressing as told by your health care provider.  Contact a health care provider if you have redness, swelling, pain, or other signs of infection around your excision site.  Keep all follow-up visits as told by your health care provider. This is important.  This information is not intended to replace advice given to you by your health care provider. Make sure you discuss any questions you have with your health care provider.  Document Released: 05/03/2016 Document Revised: 06/26/2019 Document Reviewed: 06/26/2019  [employer] Patient Education © 2020 [employer]      DIET: To avoid nausea, slowly advance diet as tolerated, avoiding spicy or greasy foods for the first day.  Add more substantial food to your diet according to your physician's instructions.  Babies can be fed formula or breast milk as  soon as they are hungry.  INCREASE FLUIDS AND FIBER TO AVOID CONSTIPATION.    SURGICAL DRESSING/BATHING: Ice pack intermittently to incision site 24-48 hours as needed   Remove plastic and gauze in 3 days (Thursday)   Leave underlying steristips on until they fall off   Patient may shower on Post OP Day #2    MEDICATIONS: Resume taking daily medication.  Take prescribed pain medication with food.  If no medication is prescribed, you may take non-aspirin pain medication if needed.  PAIN MEDICATION CAN BE VERY CONSTIPATING.  Take a stool softener or laxative such as senokot, pericolace, or milk of magnesia if needed.    Prescription given for traMADol (ULTRAM) 50 MG Tab.  Last pain medication given at  8:30, tylenol 1grm. OXYCODONE GIVEN AT 10:20AM    A follow-up appointment should be arranged with your doctor in 1-2 weeks; call to schedule.    You should CALL YOUR PHYSICIAN if you develop:  Fever greater than 101 degrees F.  Pain not relieved by medication, or persistent nausea or vomiting.  Excessive bleeding (blood soaking through dressing) or unexpected drainage from the wound.  Extreme redness or swelling around the incision site, drainage of pus or foul smelling drainage.  Inability to urinate or empty your bladder within 8 hours.  Problems with breathing or chest pain.    You should call 911 if you develop problems with breathing or chest pain.  If you are unable to contact your doctor or surgical center, you should go to the nearest emergency room or urgent care center.  Physician's telephone #: [phone]    MILD FLU-LIKE SYMPTOMS ARE NORMAL.  YOU MAY EXPERIENCE GENERALIZED MUSCLE ACHES, THROAT IRRITATION, HEADACHE AND/OR SOME NAUSEA.    If any questions arise, call your doctor.  If your doctor is not available, please feel free to call the Surgical Center at [phone].  The Center is open Monday through Friday from 7AM to 7PM.      A registered nurse may call you a few days after your surgery to see  how you are doing after your procedure.    You may also receive a survey in the mail within the next two weeks and we ask that you take a few moments to complete the survey and return it to us.  Our goal is to provide you with very good care and we value your comments.     Depression / Suicide Risk    As you are discharged from this [facility] facility, it is important to learn how to keep safe from harming yourself.    Recognize the warning signs:  Abrupt changes in personality, positive or negative- including increase in energy   Giving away possessions  Change in eating patterns- significant weight changes-  positive or negative  Change in sleeping patterns- unable to sleep or sleeping all the time   Unwillingness or inability to communicate  Depression  Unusual sadness, discouragement and loneliness  Talk of wanting to die  Neglect of personal appearance   Rebelliousness- reckless behavior  Withdrawal from people/activities they love  Confusion- inability to concentrate     If you or a loved one observes any of these behaviors or has concerns about self-harm, here's what you can do:  Talk about it- your feelings and reasons for harming yourself  Remove any means that you might use to hurt yourself (examples: pills, rope, extension cords, firearm)  Get professional help from the community (Mental Health, Substance Abuse, psychological counseling)  Do not be alone:Call your Safe Contact- someone whom you trust who will be there for you.  Call your local CRISIS HOTLINE 976-6309 or 600-446-3720  Call your local Children's Mobile Crisis Response Team [place] (539) 471-1547 or www.Intent  Call the toll free National Suicide Prevention Hotlines   National Suicide Prevention Lifeline 471-007-VOWP (3791)  [place] [facility] 800-SUICIDE (578-4413)    I acknowledge receipt and understanding of these Home Care instructions.

## 2022-10-19 ENCOUNTER — HOSPITAL ENCOUNTER (OUTPATIENT)
Dept: LAB | Facility: MEDICAL CENTER | Age: 57
End: 2022-10-19
Attending: STUDENT IN AN ORGANIZED HEALTH CARE EDUCATION/TRAINING PROGRAM
Payer: COMMERCIAL

## 2022-10-19 LAB
CHOLEST SERPL-MCNC: 144 MG/DL (ref 100–199)
FASTING STATUS PATIENT QL REPORTED: NORMAL
HDLC SERPL-MCNC: 46 MG/DL
LDLC SERPL CALC-MCNC: 81 MG/DL
PSA SERPL-MCNC: 2.26 NG/ML (ref 0–4)
TRIGL SERPL-MCNC: 86 MG/DL (ref 0–149)

## 2022-10-19 PROCEDURE — 36415 COLL VENOUS BLD VENIPUNCTURE: CPT

## 2022-10-19 PROCEDURE — 80061 LIPID PANEL: CPT

## 2022-10-19 PROCEDURE — 84153 ASSAY OF PSA TOTAL: CPT

## 2022-10-25 LAB — PATHOLOGY CONSULT NOTE: NORMAL

## 2022-12-23 ENCOUNTER — APPOINTMENT (RX ONLY)
Dept: URBAN - METROPOLITAN AREA CLINIC 4 | Facility: CLINIC | Age: 57
Setting detail: DERMATOLOGY
End: 2022-12-23

## 2022-12-23 DIAGNOSIS — Z85.828 PERSONAL HISTORY OF OTHER MALIGNANT NEOPLASM OF SKIN: ICD-10-CM

## 2022-12-23 DIAGNOSIS — L57.0 ACTINIC KERATOSIS: ICD-10-CM

## 2022-12-23 DIAGNOSIS — D22 MELANOCYTIC NEVI: ICD-10-CM

## 2022-12-23 DIAGNOSIS — Z85.820 PERSONAL HISTORY OF MALIGNANT MELANOMA OF SKIN: ICD-10-CM

## 2022-12-23 PROBLEM — D48.5 NEOPLASM OF UNCERTAIN BEHAVIOR OF SKIN: Status: ACTIVE | Noted: 2022-12-23

## 2022-12-23 PROCEDURE — ? LIQUID NITROGEN

## 2022-12-23 PROCEDURE — ? COUNSELING

## 2022-12-23 PROCEDURE — 17000 DESTRUCT PREMALG LESION: CPT | Mod: 59

## 2022-12-23 PROCEDURE — ? BIOPSY BY SHAVE METHOD

## 2022-12-23 PROCEDURE — 11102 TANGNTL BX SKIN SINGLE LES: CPT

## 2022-12-23 PROCEDURE — 99212 OFFICE O/P EST SF 10 MIN: CPT | Mod: 25

## 2022-12-23 ASSESSMENT — LOCATION DETAILED DESCRIPTION DERM
LOCATION DETAILED: LEFT LATERAL PROXIMAL PRETIBIAL REGION
LOCATION DETAILED: LEFT PROXIMAL RADIAL DORSAL FOREARM
LOCATION DETAILED: RIGHT LATERAL BUTTOCK
LOCATION DETAILED: LEFT POSTERIOR SHOULDER

## 2022-12-23 ASSESSMENT — LOCATION ZONE DERM
LOCATION ZONE: ARM
LOCATION ZONE: TRUNK
LOCATION ZONE: LEG

## 2022-12-23 ASSESSMENT — LOCATION SIMPLE DESCRIPTION DERM
LOCATION SIMPLE: RIGHT BUTTOCK
LOCATION SIMPLE: LEFT PRETIBIAL REGION
LOCATION SIMPLE: LEFT SHOULDER
LOCATION SIMPLE: LEFT FOREARM

## 2022-12-23 NOTE — PROCEDURE: LIQUID NITROGEN
Render Note In Bullet Format When Appropriate: No
Show Applicator Variable?: Yes
Detail Level: Detailed
Post-Care Instructions: I reviewed with the patient in detail post-care instructions. Patient is to wear sunprotection, and avoid picking at any of the treated lesions. Pt may apply Vaseline to crusted or scabbing areas.
Duration Of Freeze Thaw-Cycle (Seconds): 0
Number Of Freeze-Thaw Cycles: 2 freeze-thaw cycles
Consent: The patient's consent was obtained including but not limited to risks of blistering, scarring, darker or lighter pigmentary change, and recurrence.

## 2023-04-21 ENCOUNTER — APPOINTMENT (RX ONLY)
Dept: URBAN - METROPOLITAN AREA CLINIC 4 | Facility: CLINIC | Age: 58
Setting detail: DERMATOLOGY
End: 2023-04-21

## 2023-04-21 DIAGNOSIS — L57.0 ACTINIC KERATOSIS: ICD-10-CM

## 2023-04-21 DIAGNOSIS — Z85.820 PERSONAL HISTORY OF MALIGNANT MELANOMA OF SKIN: ICD-10-CM

## 2023-04-21 DIAGNOSIS — Z85.828 PERSONAL HISTORY OF OTHER MALIGNANT NEOPLASM OF SKIN: ICD-10-CM

## 2023-04-21 PROBLEM — D48.5 NEOPLASM OF UNCERTAIN BEHAVIOR OF SKIN: Status: ACTIVE | Noted: 2023-04-21

## 2023-04-21 PROCEDURE — 17003 DESTRUCT PREMALG LES 2-14: CPT

## 2023-04-21 PROCEDURE — 99213 OFFICE O/P EST LOW 20 MIN: CPT | Mod: 25

## 2023-04-21 PROCEDURE — 17000 DESTRUCT PREMALG LESION: CPT

## 2023-04-21 PROCEDURE — ? OBSERVATION

## 2023-04-21 PROCEDURE — ? DIAGNOSIS COMMENT

## 2023-04-21 PROCEDURE — ? COUNSELING

## 2023-04-21 PROCEDURE — ? LIQUID NITROGEN

## 2023-04-21 ASSESSMENT — LOCATION SIMPLE DESCRIPTION DERM
LOCATION SIMPLE: LEFT PRETIBIAL REGION
LOCATION SIMPLE: LEFT SHOULDER
LOCATION SIMPLE: LEFT CHEEK
LOCATION SIMPLE: LEFT FOREARM

## 2023-04-21 ASSESSMENT — LOCATION ZONE DERM
LOCATION ZONE: LEG
LOCATION ZONE: FACE
LOCATION ZONE: ARM

## 2023-04-21 ASSESSMENT — LOCATION DETAILED DESCRIPTION DERM
LOCATION DETAILED: LEFT PROXIMAL DORSAL FOREARM
LOCATION DETAILED: LEFT INFERIOR CENTRAL MALAR CHEEK
LOCATION DETAILED: LEFT LATERAL PROXIMAL PRETIBIAL REGION
LOCATION DETAILED: LEFT POSTERIOR SHOULDER

## 2023-04-21 NOTE — PROCEDURE: LIQUID NITROGEN
Post-Care Instructions: I reviewed with the patient in detail post-care instructions. Patient is to wear sunprotection, and avoid picking at any of the treated lesions. Pt may apply Vaseline to crusted or scabbing areas.
Duration Of Freeze Thaw-Cycle (Seconds): 0
Number Of Freeze-Thaw Cycles: 2 freeze-thaw cycles
Detail Level: Detailed
Render Post-Care Instructions In Note?: no
Consent: The patient's consent was obtained including but not limited to risks of blistering, scarring, darker or lighter pigmentary change, and recurrence.
Show Applicator Variable?: Yes

## 2023-11-14 ENCOUNTER — APPOINTMENT (RX ONLY)
Dept: URBAN - METROPOLITAN AREA CLINIC 4 | Facility: CLINIC | Age: 58
Setting detail: DERMATOLOGY
End: 2023-11-14

## 2023-11-14 DIAGNOSIS — Z85.820 PERSONAL HISTORY OF MALIGNANT MELANOMA OF SKIN: ICD-10-CM

## 2023-11-14 DIAGNOSIS — Z85.828 PERSONAL HISTORY OF OTHER MALIGNANT NEOPLASM OF SKIN: ICD-10-CM

## 2023-11-14 PROBLEM — D48.5 NEOPLASM OF UNCERTAIN BEHAVIOR OF SKIN: Status: ACTIVE | Noted: 2023-11-14

## 2023-11-14 PROCEDURE — 99213 OFFICE O/P EST LOW 20 MIN: CPT | Mod: 25

## 2023-11-14 PROCEDURE — 11102 TANGNTL BX SKIN SINGLE LES: CPT

## 2023-11-14 PROCEDURE — ? DIAGNOSIS COMMENT

## 2023-11-14 PROCEDURE — ? OBSERVATION

## 2023-11-14 PROCEDURE — ? BIOPSY BY SHAVE METHOD AND DESTRUCTION

## 2023-11-14 PROCEDURE — ? COUNSELING

## 2023-11-14 ASSESSMENT — LOCATION SIMPLE DESCRIPTION DERM
LOCATION SIMPLE: LEFT PRETIBIAL REGION
LOCATION SIMPLE: LEFT SHOULDER

## 2023-11-14 ASSESSMENT — LOCATION ZONE DERM
LOCATION ZONE: ARM
LOCATION ZONE: LEG

## 2023-11-14 ASSESSMENT — LOCATION DETAILED DESCRIPTION DERM
LOCATION DETAILED: LEFT POSTERIOR SHOULDER
LOCATION DETAILED: LEFT LATERAL PROXIMAL PRETIBIAL REGION

## 2023-11-14 NOTE — PROCEDURE: BIOPSY BY SHAVE METHOD AND DESTRUCTION
Detail Level: Detailed
Biopsy Type: H and E
Biopsy Method: Personna blade
Bill As?: Biopsy by Shave Method
Size Of Lesion In Cm (Optional): 0
Size Of Lesion After Curettage: 0.5
Anesthesia Type: 1% lidocaine with epinephrine
Hemostasis: Drysol
Destruction Type: electrodesiccation
Number Of Curettages: 2
Wound Care: Petrolatum
Lab: [id number]
Lab Facility: 
Render Path Notes In Note?: No
Consent: Written consent was obtained and risks were reviewed including but not limited to scarring, infection, bleeding, scabbing, incomplete removal, nerve damage and allergy to anesthesia.
Post-Care Instructions: I reviewed with the patient in detail post-care instructions. Patient is to keep the biopsy site dry overnight, and then apply bacitracin twice daily until healed. Patient may apply hydrogen peroxide soaks to remove any crusting.
Notification Instructions: Patient will be notified of biopsy results. However, patient instructed to call the office if not contacted within 2 weeks.
Billing Type: Third-Party Bill

## 2023-12-09 ENCOUNTER — HOSPITAL ENCOUNTER (OUTPATIENT)
Dept: LAB | Facility: MEDICAL CENTER | Age: 58
End: 2023-12-09
Attending: FAMILY MEDICINE
Payer: COMMERCIAL

## 2023-12-09 LAB
ALBUMIN SERPL BCP-MCNC: 4.4 G/DL (ref 3.2–4.9)
ALBUMIN/GLOB SERPL: 1.6 G/DL
ALP SERPL-CCNC: 78 U/L (ref 30–99)
ALT SERPL-CCNC: 21 U/L (ref 2–50)
ANION GAP SERPL CALC-SCNC: 10 MMOL/L (ref 7–16)
AST SERPL-CCNC: 18 U/L (ref 12–45)
BASOPHILS # BLD AUTO: 0.3 % (ref 0–1.8)
BASOPHILS # BLD: 0.02 K/UL (ref 0–0.12)
BILIRUB SERPL-MCNC: 1.2 MG/DL (ref 0.1–1.5)
BUN SERPL-MCNC: 18 MG/DL (ref 8–22)
CALCIUM ALBUM COR SERPL-MCNC: 9.5 MG/DL (ref 8.5–10.5)
CALCIUM SERPL-MCNC: 9.8 MG/DL (ref 8.5–10.5)
CHLORIDE SERPL-SCNC: 106 MMOL/L (ref 96–112)
CHOLEST SERPL-MCNC: 154 MG/DL (ref 100–199)
CO2 SERPL-SCNC: 27 MMOL/L (ref 20–33)
CREAT SERPL-MCNC: 0.89 MG/DL (ref 0.5–1.4)
EOSINOPHIL # BLD AUTO: 0.3 K/UL (ref 0–0.51)
EOSINOPHIL NFR BLD: 3.9 % (ref 0–6.9)
ERYTHROCYTE [DISTWIDTH] IN BLOOD BY AUTOMATED COUNT: 42 FL (ref 35.9–50)
EST. AVERAGE GLUCOSE BLD GHB EST-MCNC: 126 MG/DL
FASTING STATUS PATIENT QL REPORTED: NORMAL
GFR SERPLBLD CREATININE-BSD FMLA CKD-EPI: 99 ML/MIN/1.73 M 2
GLOBULIN SER CALC-MCNC: 2.7 G/DL (ref 1.9–3.5)
GLUCOSE SERPL-MCNC: 98 MG/DL (ref 65–99)
HBA1C MFR BLD: 6 % (ref 4–5.6)
HCT VFR BLD AUTO: 46.4 % (ref 42–52)
HDLC SERPL-MCNC: 48 MG/DL
HGB BLD-MCNC: 15.4 G/DL (ref 14–18)
IMM GRANULOCYTES # BLD AUTO: 0.02 K/UL (ref 0–0.11)
IMM GRANULOCYTES NFR BLD AUTO: 0.3 % (ref 0–0.9)
LDLC SERPL CALC-MCNC: 85 MG/DL
LYMPHOCYTES # BLD AUTO: 2.56 K/UL (ref 1–4.8)
LYMPHOCYTES NFR BLD: 33 % (ref 22–41)
MCH RBC QN AUTO: 31 PG (ref 27–33)
MCHC RBC AUTO-ENTMCNC: 33.2 G/DL (ref 32.3–36.5)
MCV RBC AUTO: 93.4 FL (ref 81.4–97.8)
MONOCYTES # BLD AUTO: 0.52 K/UL (ref 0–0.85)
MONOCYTES NFR BLD AUTO: 6.7 % (ref 0–13.4)
NEUTROPHILS # BLD AUTO: 4.34 K/UL (ref 1.82–7.42)
NEUTROPHILS NFR BLD: 55.8 % (ref 44–72)
NRBC # BLD AUTO: 0 K/UL
NRBC BLD-RTO: 0 /100 WBC (ref 0–0.2)
PLATELET # BLD AUTO: 255 K/UL (ref 164–446)
PMV BLD AUTO: 10 FL (ref 9–12.9)
POTASSIUM SERPL-SCNC: 4.7 MMOL/L (ref 3.6–5.5)
PROT SERPL-MCNC: 7.1 G/DL (ref 6–8.2)
PSA SERPL-MCNC: 2.5 NG/ML (ref 0–4)
RBC # BLD AUTO: 4.97 M/UL (ref 4.7–6.1)
SODIUM SERPL-SCNC: 143 MMOL/L (ref 135–145)
TRIGL SERPL-MCNC: 103 MG/DL (ref 0–149)
TSH SERPL DL<=0.005 MIU/L-ACNC: 1.54 UIU/ML (ref 0.38–5.33)
WBC # BLD AUTO: 7.8 K/UL (ref 4.8–10.8)

## 2023-12-09 PROCEDURE — 84153 ASSAY OF PSA TOTAL: CPT

## 2023-12-09 PROCEDURE — 80061 LIPID PANEL: CPT

## 2023-12-09 PROCEDURE — 84443 ASSAY THYROID STIM HORMONE: CPT

## 2023-12-09 PROCEDURE — 85025 COMPLETE CBC W/AUTO DIFF WBC: CPT

## 2023-12-09 PROCEDURE — 80053 COMPREHEN METABOLIC PANEL: CPT

## 2023-12-09 PROCEDURE — 36415 COLL VENOUS BLD VENIPUNCTURE: CPT

## 2023-12-09 PROCEDURE — 83036 HEMOGLOBIN GLYCOSYLATED A1C: CPT

## 2024-01-29 ENCOUNTER — APPOINTMENT (OUTPATIENT)
Dept: ADMISSIONS | Facility: MEDICAL CENTER | Age: 59
End: 2024-01-29
Attending: ORTHOPAEDIC SURGERY
Payer: COMMERCIAL

## 2024-01-30 ENCOUNTER — PHARMACY VISIT (OUTPATIENT)
Dept: PHARMACY | Facility: MEDICAL CENTER | Age: 59
End: 2024-01-30
Payer: COMMERCIAL

## 2024-01-30 ENCOUNTER — APPOINTMENT (OUTPATIENT)
Dept: RADIOLOGY | Facility: MEDICAL CENTER | Age: 59
End: 2024-01-30
Attending: ORTHOPAEDIC SURGERY
Payer: COMMERCIAL

## 2024-01-30 ENCOUNTER — HOSPITAL ENCOUNTER (OUTPATIENT)
Facility: MEDICAL CENTER | Age: 59
End: 2024-01-30
Attending: ORTHOPAEDIC SURGERY | Admitting: ORTHOPAEDIC SURGERY
Payer: COMMERCIAL

## 2024-01-30 ENCOUNTER — ANESTHESIA (OUTPATIENT)
Dept: SURGERY | Facility: MEDICAL CENTER | Age: 59
End: 2024-01-30
Payer: COMMERCIAL

## 2024-01-30 ENCOUNTER — ANESTHESIA EVENT (OUTPATIENT)
Dept: SURGERY | Facility: MEDICAL CENTER | Age: 59
End: 2024-01-30
Payer: COMMERCIAL

## 2024-01-30 VITALS
RESPIRATION RATE: 15 BRPM | WEIGHT: 264.11 LBS | HEIGHT: 73 IN | TEMPERATURE: 96.8 F | DIASTOLIC BLOOD PRESSURE: 77 MMHG | SYSTOLIC BLOOD PRESSURE: 160 MMHG | HEART RATE: 93 BPM | BODY MASS INDEX: 35 KG/M2 | OXYGEN SATURATION: 93 %

## 2024-01-30 DIAGNOSIS — G89.18 ACUTE POSTOPERATIVE PAIN: ICD-10-CM

## 2024-01-30 PROCEDURE — 700102 HCHG RX REV CODE 250 W/ 637 OVERRIDE(OP): Performed by: ANESTHESIOLOGY

## 2024-01-30 PROCEDURE — RXMED WILLOW AMBULATORY MEDICATION CHARGE: Performed by: ORTHOPAEDIC SURGERY

## 2024-01-30 PROCEDURE — A9270 NON-COVERED ITEM OR SERVICE: HCPCS | Performed by: ANESTHESIOLOGY

## 2024-01-30 PROCEDURE — 160041 HCHG SURGERY MINUTES - EA ADDL 1 MIN LEVEL 4: Performed by: ORTHOPAEDIC SURGERY

## 2024-01-30 PROCEDURE — 160046 HCHG PACU - 1ST 60 MINS PHASE II: Performed by: ORTHOPAEDIC SURGERY

## 2024-01-30 PROCEDURE — 700105 HCHG RX REV CODE 258: Performed by: ORTHOPAEDIC SURGERY

## 2024-01-30 PROCEDURE — 160035 HCHG PACU - 1ST 60 MINS PHASE I: Performed by: ORTHOPAEDIC SURGERY

## 2024-01-30 PROCEDURE — 700101 HCHG RX REV CODE 250: Performed by: ORTHOPAEDIC SURGERY

## 2024-01-30 PROCEDURE — 700111 HCHG RX REV CODE 636 W/ 250 OVERRIDE (IP): Performed by: ANESTHESIOLOGY

## 2024-01-30 PROCEDURE — 160009 HCHG ANES TIME/MIN: Performed by: ORTHOPAEDIC SURGERY

## 2024-01-30 PROCEDURE — 700101 HCHG RX REV CODE 250: Performed by: ANESTHESIOLOGY

## 2024-01-30 PROCEDURE — 700111 HCHG RX REV CODE 636 W/ 250 OVERRIDE (IP): Mod: JZ | Performed by: ANESTHESIOLOGY

## 2024-01-30 PROCEDURE — 73562 X-RAY EXAM OF KNEE 3: CPT | Mod: LT

## 2024-01-30 PROCEDURE — 160025 RECOVERY II MINUTES (STATS): Performed by: ORTHOPAEDIC SURGERY

## 2024-01-30 PROCEDURE — 64447 NJX AA&/STRD FEMORAL NRV IMG: CPT | Performed by: ORTHOPAEDIC SURGERY

## 2024-01-30 PROCEDURE — C1713 ANCHOR/SCREW BN/BN,TIS/BN: HCPCS | Performed by: ORTHOPAEDIC SURGERY

## 2024-01-30 PROCEDURE — 160002 HCHG RECOVERY MINUTES (STAT): Performed by: ORTHOPAEDIC SURGERY

## 2024-01-30 PROCEDURE — 160048 HCHG OR STATISTICAL LEVEL 1-5: Performed by: ORTHOPAEDIC SURGERY

## 2024-01-30 PROCEDURE — 160029 HCHG SURGERY MINUTES - 1ST 30 MINS LEVEL 4: Performed by: ORTHOPAEDIC SURGERY

## 2024-01-30 PROCEDURE — C1769 GUIDE WIRE: HCPCS | Performed by: ORTHOPAEDIC SURGERY

## 2024-01-30 DEVICE — CABLE CERCLAGE 1.0 X 750MM: Type: IMPLANTABLE DEVICE | Site: KNEE | Status: FUNCTIONAL

## 2024-01-30 DEVICE — IMPLANTABLE DEVICE: Type: IMPLANTABLE DEVICE | Site: KNEE | Status: FUNCTIONAL

## 2024-01-30 DEVICE — WASHER ASIF 7.0 SM 219.98 (7TX6+4TX4=58): Type: IMPLANTABLE DEVICE | Site: KNEE | Status: FUNCTIONAL

## 2024-01-30 RX ORDER — HYDROMORPHONE HYDROCHLORIDE 1 MG/ML
0.2 INJECTION, SOLUTION INTRAMUSCULAR; INTRAVENOUS; SUBCUTANEOUS
Status: DISCONTINUED | OUTPATIENT
Start: 2024-01-30 | End: 2024-01-30 | Stop reason: HOSPADM

## 2024-01-30 RX ORDER — EPHEDRINE SULFATE 50 MG/ML
5 INJECTION, SOLUTION INTRAVENOUS
Status: DISCONTINUED | OUTPATIENT
Start: 2024-01-30 | End: 2024-01-30 | Stop reason: HOSPADM

## 2024-01-30 RX ORDER — MAGNESIUM SULFATE HEPTAHYDRATE 40 MG/ML
INJECTION, SOLUTION INTRAVENOUS PRN
Status: DISCONTINUED | OUTPATIENT
Start: 2024-01-30 | End: 2024-01-30 | Stop reason: SURG

## 2024-01-30 RX ORDER — OXYCODONE HCL 5 MG/5 ML
10 SOLUTION, ORAL ORAL
Status: COMPLETED | OUTPATIENT
Start: 2024-01-30 | End: 2024-01-30

## 2024-01-30 RX ORDER — MIDAZOLAM HYDROCHLORIDE 1 MG/ML
1 INJECTION INTRAMUSCULAR; INTRAVENOUS
Status: DISCONTINUED | OUTPATIENT
Start: 2024-01-30 | End: 2024-01-30 | Stop reason: HOSPADM

## 2024-01-30 RX ORDER — LIDOCAINE HYDROCHLORIDE 20 MG/ML
INJECTION, SOLUTION EPIDURAL; INFILTRATION; INTRACAUDAL; PERINEURAL PRN
Status: DISCONTINUED | OUTPATIENT
Start: 2024-01-30 | End: 2024-01-30 | Stop reason: SURG

## 2024-01-30 RX ORDER — HYDROMORPHONE HYDROCHLORIDE 1 MG/ML
0.1 INJECTION, SOLUTION INTRAMUSCULAR; INTRAVENOUS; SUBCUTANEOUS
Status: DISCONTINUED | OUTPATIENT
Start: 2024-01-30 | End: 2024-01-30 | Stop reason: HOSPADM

## 2024-01-30 RX ORDER — DIPHENHYDRAMINE HYDROCHLORIDE 50 MG/ML
12.5 INJECTION INTRAMUSCULAR; INTRAVENOUS
Status: DISCONTINUED | OUTPATIENT
Start: 2024-01-30 | End: 2024-01-30 | Stop reason: HOSPADM

## 2024-01-30 RX ORDER — CEFAZOLIN SODIUM 1 G/3ML
INJECTION, POWDER, FOR SOLUTION INTRAMUSCULAR; INTRAVENOUS PRN
Status: DISCONTINUED | OUTPATIENT
Start: 2024-01-30 | End: 2024-01-30 | Stop reason: SURG

## 2024-01-30 RX ORDER — HALOPERIDOL 5 MG/ML
1 INJECTION INTRAMUSCULAR
Status: DISCONTINUED | OUTPATIENT
Start: 2024-01-30 | End: 2024-01-30 | Stop reason: HOSPADM

## 2024-01-30 RX ORDER — GLYCOPYRROLATE 0.2 MG/ML
INJECTION INTRAMUSCULAR; INTRAVENOUS PRN
Status: DISCONTINUED | OUTPATIENT
Start: 2024-01-30 | End: 2024-01-30 | Stop reason: SURG

## 2024-01-30 RX ORDER — MIDAZOLAM HYDROCHLORIDE 1 MG/ML
INJECTION INTRAMUSCULAR; INTRAVENOUS PRN
Status: DISCONTINUED | OUTPATIENT
Start: 2024-01-30 | End: 2024-01-30 | Stop reason: SURG

## 2024-01-30 RX ORDER — ONDANSETRON 2 MG/ML
INJECTION INTRAMUSCULAR; INTRAVENOUS PRN
Status: DISCONTINUED | OUTPATIENT
Start: 2024-01-30 | End: 2024-01-30 | Stop reason: SURG

## 2024-01-30 RX ORDER — MEPERIDINE HYDROCHLORIDE 25 MG/ML
12.5 INJECTION INTRAMUSCULAR; INTRAVENOUS; SUBCUTANEOUS
Status: DISCONTINUED | OUTPATIENT
Start: 2024-01-30 | End: 2024-01-30 | Stop reason: HOSPADM

## 2024-01-30 RX ORDER — ACETAMINOPHEN 500 MG
1000 TABLET ORAL ONCE
Status: DISCONTINUED | OUTPATIENT
Start: 2024-01-30 | End: 2024-01-30 | Stop reason: HOSPADM

## 2024-01-30 RX ORDER — OXYCODONE AND ACETAMINOPHEN 10; 325 MG/1; MG/1
.5-1 TABLET ORAL EVERY 4 HOURS PRN
Qty: 30 TABLET | Refills: 0 | Status: SHIPPED | OUTPATIENT
Start: 2024-01-30 | End: 2024-02-09

## 2024-01-30 RX ORDER — HYDRALAZINE HYDROCHLORIDE 20 MG/ML
5 INJECTION INTRAMUSCULAR; INTRAVENOUS
Status: DISCONTINUED | OUTPATIENT
Start: 2024-01-30 | End: 2024-01-30 | Stop reason: HOSPADM

## 2024-01-30 RX ORDER — LABETALOL HYDROCHLORIDE 5 MG/ML
INJECTION, SOLUTION INTRAVENOUS PRN
Status: DISCONTINUED | OUTPATIENT
Start: 2024-01-30 | End: 2024-01-30 | Stop reason: SURG

## 2024-01-30 RX ORDER — HYDROMORPHONE HYDROCHLORIDE 2 MG/ML
INJECTION, SOLUTION INTRAMUSCULAR; INTRAVENOUS; SUBCUTANEOUS PRN
Status: DISCONTINUED | OUTPATIENT
Start: 2024-01-30 | End: 2024-01-30 | Stop reason: SURG

## 2024-01-30 RX ORDER — HYDROMORPHONE HYDROCHLORIDE 1 MG/ML
0.4 INJECTION, SOLUTION INTRAMUSCULAR; INTRAVENOUS; SUBCUTANEOUS
Status: DISCONTINUED | OUTPATIENT
Start: 2024-01-30 | End: 2024-01-30 | Stop reason: HOSPADM

## 2024-01-30 RX ORDER — DEXAMETHASONE SODIUM PHOSPHATE 4 MG/ML
INJECTION, SOLUTION INTRA-ARTICULAR; INTRALESIONAL; INTRAMUSCULAR; INTRAVENOUS; SOFT TISSUE
Status: COMPLETED | OUTPATIENT
Start: 2024-01-30 | End: 2024-01-30

## 2024-01-30 RX ORDER — ONDANSETRON 2 MG/ML
4 INJECTION INTRAMUSCULAR; INTRAVENOUS
Status: DISCONTINUED | OUTPATIENT
Start: 2024-01-30 | End: 2024-01-30 | Stop reason: HOSPADM

## 2024-01-30 RX ORDER — OXYCODONE HCL 5 MG/5 ML
5 SOLUTION, ORAL ORAL
Status: COMPLETED | OUTPATIENT
Start: 2024-01-30 | End: 2024-01-30

## 2024-01-30 RX ORDER — DEXAMETHASONE SODIUM PHOSPHATE 4 MG/ML
INJECTION, SOLUTION INTRA-ARTICULAR; INTRALESIONAL; INTRAMUSCULAR; INTRAVENOUS; SOFT TISSUE PRN
Status: DISCONTINUED | OUTPATIENT
Start: 2024-01-30 | End: 2024-01-30 | Stop reason: SURG

## 2024-01-30 RX ORDER — MELOXICAM 7.5 MG/1
7.5 TABLET ORAL DAILY
Qty: 30 TABLET | Refills: 0 | Status: ON HOLD | OUTPATIENT
Start: 2024-01-30 | End: 2024-02-07

## 2024-01-30 RX ORDER — SODIUM CHLORIDE, SODIUM LACTATE, POTASSIUM CHLORIDE, CALCIUM CHLORIDE 600; 310; 30; 20 MG/100ML; MG/100ML; MG/100ML; MG/100ML
INJECTION, SOLUTION INTRAVENOUS CONTINUOUS
Status: DISCONTINUED | OUTPATIENT
Start: 2024-01-30 | End: 2024-01-30 | Stop reason: HOSPADM

## 2024-01-30 RX ORDER — SODIUM CHLORIDE, SODIUM LACTATE, POTASSIUM CHLORIDE, CALCIUM CHLORIDE 600; 310; 30; 20 MG/100ML; MG/100ML; MG/100ML; MG/100ML
INJECTION, SOLUTION INTRAVENOUS CONTINUOUS
Status: ACTIVE | OUTPATIENT
Start: 2024-01-30 | End: 2024-01-30

## 2024-01-30 RX ORDER — ROPIVACAINE HYDROCHLORIDE 5 MG/ML
INJECTION, SOLUTION EPIDURAL; INFILTRATION; PERINEURAL
Status: COMPLETED | OUTPATIENT
Start: 2024-01-30 | End: 2024-01-30

## 2024-01-30 RX ADMIN — FENTANYL CITRATE 50 MCG: 50 INJECTION, SOLUTION INTRAMUSCULAR; INTRAVENOUS at 13:17

## 2024-01-30 RX ADMIN — ROPIVACAINE HYDROCHLORIDE 22 ML: 5 INJECTION EPIDURAL; INFILTRATION; PERINEURAL at 13:23

## 2024-01-30 RX ADMIN — ONDANSETRON 4 MG: 2 INJECTION INTRAMUSCULAR; INTRAVENOUS at 14:42

## 2024-01-30 RX ADMIN — CEFAZOLIN 3 G: 1 INJECTION, POWDER, FOR SOLUTION INTRAMUSCULAR; INTRAVENOUS at 13:34

## 2024-01-30 RX ADMIN — PROPOFOL 25 MG: 10 INJECTION, EMULSION INTRAVENOUS at 13:47

## 2024-01-30 RX ADMIN — LABETALOL HYDROCHLORIDE 10 MG: 5 INJECTION, SOLUTION INTRAVENOUS at 15:48

## 2024-01-30 RX ADMIN — GLYCOPYRROLATE 0.1 MG: 0.2 INJECTION INTRAMUSCULAR; INTRAVENOUS at 13:58

## 2024-01-30 RX ADMIN — OXYCODONE HYDROCHLORIDE 10 MG: 5 SOLUTION ORAL at 16:09

## 2024-01-30 RX ADMIN — PROPOFOL 25 MG: 10 INJECTION, EMULSION INTRAVENOUS at 13:49

## 2024-01-30 RX ADMIN — LIDOCAINE HYDROCHLORIDE 80 MG: 20 INJECTION, SOLUTION EPIDURAL; INFILTRATION; INTRACAUDAL at 13:30

## 2024-01-30 RX ADMIN — HYDROMORPHONE HYDROCHLORIDE 0.2 MG: 2 INJECTION INTRAMUSCULAR; INTRAVENOUS; SUBCUTANEOUS at 14:58

## 2024-01-30 RX ADMIN — DEXAMETHASONE SODIUM PHOSPHATE 3 MG: 4 INJECTION INTRA-ARTICULAR; INTRALESIONAL; INTRAMUSCULAR; INTRAVENOUS; SOFT TISSUE at 13:23

## 2024-01-30 RX ADMIN — FENTANYL CITRATE 50 MCG: 50 INJECTION, SOLUTION INTRAMUSCULAR; INTRAVENOUS at 13:45

## 2024-01-30 RX ADMIN — FENTANYL CITRATE 25 MCG: 50 INJECTION, SOLUTION INTRAMUSCULAR; INTRAVENOUS at 16:09

## 2024-01-30 RX ADMIN — MAGNESIUM SULFATE HEPTAHYDRATE 2 G: 2 INJECTION, SOLUTION INTRAVENOUS at 13:49

## 2024-01-30 RX ADMIN — LIDOCAINE HYDROCHLORIDE 0.5 ML: 10 INJECTION, SOLUTION EPIDURAL; INFILTRATION; INTRACAUDAL; PERINEURAL at 11:00

## 2024-01-30 RX ADMIN — HYDROMORPHONE HYDROCHLORIDE 0.5 MG: 2 INJECTION INTRAMUSCULAR; INTRAVENOUS; SUBCUTANEOUS at 14:38

## 2024-01-30 RX ADMIN — FENTANYL CITRATE 50 MCG: 50 INJECTION, SOLUTION INTRAMUSCULAR; INTRAVENOUS at 13:22

## 2024-01-30 RX ADMIN — DEXAMETHASONE SODIUM PHOSPHATE 4 MG: 4 INJECTION, SOLUTION INTRA-ARTICULAR; INTRALESIONAL; INTRAMUSCULAR; INTRAVENOUS; SOFT TISSUE at 13:41

## 2024-01-30 RX ADMIN — SODIUM CHLORIDE, POTASSIUM CHLORIDE, SODIUM LACTATE AND CALCIUM CHLORIDE: 600; 310; 30; 20 INJECTION, SOLUTION INTRAVENOUS at 11:00

## 2024-01-30 RX ADMIN — FENTANYL CITRATE 25 MCG: 50 INJECTION, SOLUTION INTRAMUSCULAR; INTRAVENOUS at 14:36

## 2024-01-30 RX ADMIN — MIDAZOLAM HYDROCHLORIDE 2 MG: 1 INJECTION, SOLUTION INTRAMUSCULAR; INTRAVENOUS at 13:14

## 2024-01-30 RX ADMIN — FENTANYL CITRATE 25 MCG: 50 INJECTION, SOLUTION INTRAMUSCULAR; INTRAVENOUS at 16:16

## 2024-01-30 RX ADMIN — PROPOFOL 250 MG: 10 INJECTION, EMULSION INTRAVENOUS at 13:30

## 2024-01-30 RX ADMIN — HYDROMORPHONE HYDROCHLORIDE 0.3 MG: 2 INJECTION INTRAMUSCULAR; INTRAVENOUS; SUBCUTANEOUS at 15:09

## 2024-01-30 RX ADMIN — FENTANYL CITRATE 50 MCG: 50 INJECTION, SOLUTION INTRAMUSCULAR; INTRAVENOUS at 15:22

## 2024-01-30 RX ADMIN — GLYCOPYRROLATE 0.2 MG: 0.2 INJECTION INTRAMUSCULAR; INTRAVENOUS at 13:50

## 2024-01-30 RX ADMIN — FENTANYL CITRATE 25 MCG: 50 INJECTION, SOLUTION INTRAMUSCULAR; INTRAVENOUS at 14:32

## 2024-01-30 ASSESSMENT — PAIN SCALES - GENERAL: PAIN_LEVEL: 0

## 2024-01-30 ASSESSMENT — PAIN DESCRIPTION - PAIN TYPE
TYPE: ACUTE PAIN;SURGICAL PAIN
TYPE: SURGICAL PAIN
TYPE: ACUTE PAIN;SURGICAL PAIN

## 2024-01-30 ASSESSMENT — FIBROSIS 4 INDEX: FIB4 SCORE: 0.91

## 2024-01-30 NOTE — H&P
DATE OF ADMISSION:  01/30/2024     CHIEF COMPLAINT:  Left knee pain.     HISTORY OF PRESENT ILLNESS:  The patient is a 58 years old.  He is to ice   skating a few days ago, fell, landed onto his flexed left knee, was unable to   extend his knee, presented to outside facility, had x-rays, suggesting an   inferior pole transverse displaced patellar fracture.  He was treated in a   knee brace in full extension and crutches.  We met and discussed treatment   options.  I recommended surgical management.  He is here for preoperative   evaluation for surgery today.  He denies other significant injuries.  He   denies numbness or paresthesias in the lower extremity.     ALLERGIES:  No known drug allergies.     OUTPATIENT MEDICATIONS:  Lisinopril/hydrochlorothiazide, travoprost.     PAST MEDICAL DIAGNOSES:  History of melanoma excised a year and a half ago,   hypertension, glaucoma, sleep apnea.     PAST SURGICAL HISTORY:  Appendectomy as a child.  Wide excision of a melanoma,   left posterior shoulder area, 09/26/2022.     SOCIAL HISTORY:  He denies tobacco use.  Drinks alcohol just occasionally.    Denies illicit drug use.     PHYSICAL EXAMINATION:    VITAL SIGNS:  Temperature is 96.6, heart rate 67, respiratory rate 18, blood   pressure 144/88, pulse oximetry 94% on room air.  GENERAL APPEARANCE:  The patient is alert, oriented, pleasant, cooperative, in   no acute distress.  MUSCULOSKELETAL:  Left lower extremity has a hinged knee brace in place with   the knee extended.  He has anterior knee swelling or knee effusion present.    There are no open wounds at the anterior aspect of the knee.  There is skin   wrinkling present.  There are no blisters.  He is neurovascularly intact   distally.  There is no evidence of obvious traumatic deformity to the   bilateral upper or right lower extremity, which are grossly neurovascularly   intact.     DIAGNOSTIC IMAGING:  Plain x-rays from an outside facility show a displaced    inferior pole patellar fracture with disruption of the extensor mechanism.    There is some suggestion of some comminution at the inferior pole.     IMPRESSION AND RECOMMENDATIONS:    1.  I discussed findings with the patient.  We discussed treatment options and   I do recommend surgical management for his transverse patellar fracture with   disruption of his extensor mechanism.  2.  We did discuss treatment options with him to decide intraoperatively   whether to perform a partial patellectomy and a primary patellar tendon repair   versus surgical fixation of his inferior pole.  We discussed pros and cons of   both options and he is in agreement and comfortable with intraoperative   decision regarding that particular aspect of the surgery.  3.  We did discuss potential risks of surgery including infection, wound   healing issues, implant prominence, potentially requiring removal, knee   stiffness, loss of complete knee extension and potential for loss of fixation   or re-rupture and general risks of anesthesia.  He expressed understanding and   wished to proceed with surgery when possible.  4.  He is currently n.p.o. make preparations to get him to the operating room   today for surgical management.        ______________________________  MD DRISS Grady/ANGEL    DD:  01/30/2024 11:39  DT:  01/30/2024 13:18    Job#:  [id number]

## 2024-01-30 NOTE — ANESTHESIA PREPROCEDURE EVALUATION
Case: [id number] Date/Time: 01/30/24 1239    Procedure: LEFT PATELLA FRACTURE SURGICAL REDUCTION AND FIXATION, OTHER INDICATED PROCDURES (Left)    Pre-op diagnosis: CLOSED FRATRUE OF LEFT PATELLA    Location: [facility] OR 12 / SURGERY [facility]    Surgeons: Cristobal Ledesma M.D.          57 yo male  Ice skating injury    P Med Hx:  Hypertension  Sleep apnea  Snoring  Glaucoma  H/o Melanoma of back 2022  Pain    P Surg Hx:  Appy  Wide exc melanoma from back  No reported problems with prior anesthesia    Non-smoker  Rare EtOH use (2-3 beers / month)    NPO  Relevant Problems   No relevant active problems       Physical Exam    Airway   Mallampati: II  TM distance: >3 FB  Neck ROM: full       Cardiovascular - normal exam  Rhythm: regular  Rate: normal  (-) murmur     Dental - normal exam           Pulmonary - normal exam  Breath sounds clear to auscultation     Abdominal    Neurological - normal exam                   Anesthesia Plan    ASA 2       Plan - general and peripheral nerve block     Peripheral nerve block will be post-op pain control  Airway plan will be LMA          Induction: intravenous    Postoperative Plan: Postoperative administration of opioids is intended.    Pertinent diagnostic labs and testing reviewed    Informed Consent:    Anesthetic plan and risks discussed with patient.    Use of blood products discussed with: patient whom consented to blood products.

## 2024-01-30 NOTE — ANESTHESIA PROCEDURE NOTES
Airway    Date/Time: 1/30/2024 1:30 PM    Performed by: Sloan Mack M.D.  Authorized by: Sloan Mack M.D.    Location:  OR  Urgency:  Elective  Indications for Airway Management:  Anesthesia      Spontaneous Ventilation: absent    Sedation Level:  Deep  Preoxygenated: Yes    Mask Difficulty Assessment:  1 - vent by mask  Final Airway Type:  Supraglottic airway  Final Supraglottic Airway:  Standard LMA    SGA Size:  5  Number of Attempts at Approach:  1

## 2024-01-30 NOTE — ANESTHESIA PROCEDURE NOTES
Peripheral Block    Date/Time: 1/30/2024 1:23 PM    Performed by: Sloan Mack M.D.  Authorized by: Sloan Mack M.D.    Patient Location:  OR  Start Time:  1/30/2024 1:23 PM  End Time:  1/30/2024 1:27 PM  Reason for Block: at surgeon's request and post-op pain management ONLY    patient identified, IV checked, site marked, risks and benefits discussed, surgical consent, monitors and equipment checked, pre-op evaluation and timeout performed    Patient Position:  Supine  Prep: ChloraPrep    Monitoring:  Heart rate, continuous pulse ox and cardiac monitor  Block Region:  Lower Extremity  Lower Extremity - Block Type:  Selective FEMORAL nerve block at the Adductor Canal    Laterality:  Left  Procedures: ultrasound guided  Image captured, interpreted and electronically stored.  Local Infiltration:  Lidocaine  Strength:  1 %  Dose:  3 ml  Block Type:  Single-shot  Needle Length:  100mm  Needle Gauge:  21 G  Needle Localization:  Ultrasound guidance  Ultrasound picture in chart  Injection Assessment:  Negative aspiration for heme, no paresthesia on injection, incremental injection and local visualized surrounding nerve on ultrasound  Evidence of intravascular injection: No     Patient awake and talking throughout.  No complaints.  No apparent complications.  Needle tip, artery and nerve well visualized.  Negative aspirates.  Incremental injection of ropivacaine

## 2024-01-30 NOTE — PROGRESS NOTES
Medication history reviewed with patient at bedside.   Med rec is complete  Allergies reviewed.   Patient has not had any outpatient antibiotics in the last 30 days.   Anticoagulants: No    Gustavo Webb

## 2024-01-30 NOTE — OR SURGEON
Immediate Post OP Note    PreOp Diagnosis: Left comminuted, displaced patella fracture      PostOp Diagnosis: same      Procedure(s):  LEFT PATELLA FRACTURE SURGICAL REDUCTION AND FIXATION - Wound Class: Clean    Surgeon(s):  Cristobal Ledesma M.D.    Anesthesiologist/Type of Anesthesia:  Anesthesiologist: Sloan Mack M.D./General    Surgical Staff:  Circulator: Lenore Huang R.N.; Amita Gaona R.N.  Scrub Person: Celestino Mendoza  Radiology Technologist: Adri Womack    Specimens removed if any:  * No specimens in log *    Estimated Blood Loss: minimal    Findings: see dictation    Complications: none known    PLAN:  --discharge to home from PACU  --NWB LLE with knee fully extended in knee brace with crutches  --no knee flexion  --fu 2 weeks postop        1/30/2024 3:48 PM Cristobal Ledesma M.D.

## 2024-01-31 NOTE — OP REPORT
DATE OF SERVICE:  01/30/2024     PREOPERATIVE DIAGNOSIS:  Left displaced patellar fracture.     POSTOPERATIVE DIAGNOSIS:  Left displaced patellar fracture.     PROCEDURE PERFORMED:  Open treatment with internal fixation of left patellar   fracture.     SURGEON:  Cristobal Ledesma MD     ANESTHESIOLOGIST:  Sloan Mack MD     ANESTHESIA:  General and regional.     ESTIMATED BLOOD LOSS:  Minimal.     TOURNIQUET TIME:  114 minutes at 250 mmHg to the left thigh.     IMPLANTS:  1.  Synthes 4.0 mm partially threaded cannulated screws with 2 washers and 1   mm cable and cable crimp.  2.  Total of 2 #5 FiberWire.     INDICATIONS FOR PROCEDURE:  The patient is 58 years old.  He several days ago   injured his left knee when he fell on ice skating.  He was seen at an outside   facility and was found to have a displaced inferior pole patellar fracture   with disruption of his extensor mechanism.  We discussed treatment options and   recommended surgical reduction and fixation.  He signed informed consent and   wished to proceed with surgery as outlined above.     DESCRIPTION OF PROCEDURE:  The patient was met in the preoperative holding   area.  His surgical site was signed.  His consent was confirmed to be   accurate.  He was taken back to the operating room.  Dr. Mack performed a   regional anesthetic at my request to help with postoperative pain control.    General anesthesia was then induced.  Left lower extremity had tourniquet   applied to the thigh, was then provisionally cleansed with isopropyl alcohol   and then prepped and draped in the usual sterile fashion.  A formal timeout   was performed to confirm patient's correct name, correct surgical site,   correct procedure and correct laterality.  The limb was exsanguinated with an   Esmarch and tourniquet was inflated to 275 mmHg.  Anterior midline incision   was made over the patella with a scalpel down through skin.  Dissection was   carried sharply down through  the subcutaneous tissues and full-thickness   medial and lateral subfascial flaps were elevated.  The patellar fracture was   identified.  I used a rongeur to remove hematoma from the fracture site and   hemarthrosis from the knee joint.  I thoroughly lavaged the knee joint of any   potential particulate debris.  I then used a rongeur to evaluate the fracture.    There was comminution at the inferior pole.  The inferior pole lateral bone   was significantly comminuted and excised with a 15-blade scalpel.  The central   portion of the inferior pole had a shell of anterior bone and good attachment   of the patellar tendon and I felt it would be worth fixing the inferior pole   to the remaining proximal portion of the patella to facilitate bony healing   rather than complete inferior pole patellectomy and tendon advancement.  I was   able to reduce the inferior pole in near anatomic alignment with combination   of reduction forceps and placed two 1.25 mm K-wires as guide pins for 4.0   cannulated screws from distal to proximal using fluoroscopic guidance and   prepared for and inserted two 4.0 mm partially threaded cannulated screws with   washers, both screws had excellent bony purchase.  I did still feel that he   would benefit from repair of the patellar tendon through bone tunnels to the   proximal patella.  I therefore used a locking Krackow type suture   configuration with a total of two #5 FiberWires in the patellar tendon and   drilled bone tunnels through the patella exiting the proximal pole, securely   tensioned the FiberWire and securely tied them through the bone tunnels in the   proximal pole.  I felt that reinforcing the screw fixation would be   beneficial with tension band wiring.  I passed a 1 mm cable crimp in a   figure-of-eight fashion through the cannulated screws and tensioned the cable   and crimped the cable crimp then cut excess cable.  Final   fluoroscopic imaging then confirmed overall  acceptable alignment of the   fracture and acceptable position of the implants.  Wounds were thoroughly   irrigated with normal saline.  I repaired the disruption in the medial and   lateral patellar retinaculum with #2 FiberWire closing the knee joint,   reapproximated the fascial and subcuticular layers with 0 Vicryl, 2-0 Vicryl   and skin edges with staples.  Wounds were thoroughly cleansed and dried and   sterile compressive dressing was applied.  He was placed back into his knee   brace locked in full extension.  The tourniquet had been deflated after 114   minutes and good perfusion returned to the extremity.  He was then awoken from   anesthesia and transferred on the rOxford and taken to postanesthesia care   unit in stable condition.     PLAN:  1.  The patient will be discharged home from the PACU when recovers from   anesthesia.  2.  He can weightbear as tolerated to left lower extremity, his knee fully   extended in his locked knee brace.  3.  He should follow up with me as scheduled 2 weeks postop for routine wound   check, staple removal and x-rays, 2 views of left knee with his knee extended.        ______________________________  MD DRISS Grady/SAHIL/KARLA    DD:  01/30/2024 15:59  DT:  01/30/2024 17:34    Job#:  [id number]

## 2024-01-31 NOTE — OR NURSING
Pt's VSS. Pt A&Ox4. Pt denies nausea. Pt states pain 3/10. Pt's left knee dressing CDI. Pt to discharge home.

## 2024-01-31 NOTE — DISCHARGE INSTRUCTIONS
HOME CARE INSTRUCTIONS    ACTIVITY: Rest and take it easy for the first 24 hours.  A responsible adult is recommended to remain with you during that time.  It is normal to feel sleepy.  We encourage you to not do anything that requires balance, judgment or coordination.    FOR 24 HOURS DO NOT:  Drive, operate machinery or run household appliances.  Drink beer or alcoholic beverages.  Make important decisions or sign legal documents.    SPECIAL INSTRUCTIONS:   SEE PARISH FOLDER FOR ADDITIONAL INSTRUCTIONS.  -Non Weight Bearing left lower extremity with knee fully extended in knee brace with crutches  -no knee flexion  -okay to remove compressive dressing after 4-5 days if needed, leave silver dressing in placed until follow up appointment   -okay to shower with silver dressing in place if good seal after 4-5 days  -Rx for percocet sent to pharmacy as needed for moderate postop pain  -okay to take meloxicam and/or tylenol as needed mild postop pain  -follow up 2 weeks postop     DIET: To avoid nausea, slowly advance diet as tolerated, avoiding spicy or greasy foods for the first day.  Add more substantial food to your diet according to your physician's instructions. INCREASE FLUIDS AND FIBER TO AVOID CONSTIPATION.      MEDICATIONS: Resume taking daily medication.  Take prescribed pain medication with food.  If no medication is prescribed, you may take non-aspirin pain medication if needed.  PAIN MEDICATION CAN BE VERY CONSTIPATING.  Take a stool softener or laxative such as senokot, pericolace, or milk of magnesia if needed.    Prescription given for percocet.  Last pain medication given at 4:09PM (OXYCODONE) .    A follow-up appointment should be arranged with your doctor. Call to schedule.    You should CALL YOUR PHYSICIAN if you develop:  Fever greater than 101 degrees F.  Pain not relieved by medication, or persistent nausea or vomiting.  Excessive bleeding (blood soaking through dressing) or unexpected drainage from  the wound.  Extreme redness or swelling around the incision site, drainage of pus or foul smelling drainage.  Inability to urinate or empty your bladder within 8 hours.  Problems with breathing or chest pain.    You should call 911 if you develop problems with breathing or chest pain.  If you are unable to contact your doctor or surgical center, you should go to the nearest emergency room or urgent care center.  Physician's telephone #: [phone]      MILD FLU-LIKE SYMPTOMS ARE NORMAL.  YOU MAY EXPERIENCE GENERALIZED MUSCLE ACHES, THROAT IRRITATION, HEADACHE AND/OR SOME NAUSEA.    If any questions arise, call your doctor.  If your doctor is not available, please feel free to call the Surgical Center at [phone].  The Center is open Monday through Friday from 7AM to 7PM.      A registered nurse may call you a few days after your surgery to see how you are doing after your procedure.    You may also receive a survey in the mail within the next two weeks and we ask that you take a few moments to complete the survey and return it to us.  Our goal is to provide you with very good care and we value your comments.     Depression / Suicide Risk    As you are discharged from this [facility] facility, it is important to learn how to keep safe from harming yourself.    Recognize the warning signs:  Abrupt changes in personality, positive or negative- including increase in energy   Giving away possessions  Change in eating patterns- significant weight changes-  positive or negative  Change in sleeping patterns- unable to sleep or sleeping all the time   Unwillingness or inability to communicate  Depression  Unusual sadness, discouragement and loneliness  Talk of wanting to die  Neglect of personal appearance   Rebelliousness- reckless behavior  Withdrawal from people/activities they love  Confusion- inability to concentrate     If you or a loved one observes any of these behaviors or has concerns about self-harm,  here's what you can do:  Talk about it- your feelings and reasons for harming yourself  Remove any means that you might use to hurt yourself (examples: pills, rope, extension cords, firearm)  Get professional help from the community (Mental Health, Substance Abuse, psychological counseling)  Do not be alone:Call your Safe Contact- someone whom you trust who will be there for you.  Call your local CRISIS HOTLINE 240-7960 or 068-624-9656  Call your local Children's Mobile Crisis Response Team [place] (880) 540-1099 or www.[facility] in [place]  Call the toll free National Suicide Prevention Hotlines   National Suicide Prevention Lifeline 849-307-AILM (0708)  National Hope Line Network 800-SUICIDE (022-7942)    I acknowledge receipt and understanding of these Home Care instructions.

## 2024-01-31 NOTE — ANESTHESIA TIME REPORT
Anesthesia Start and Stop Event Times       Date Time Event    1/30/2024 1313 Ready for Procedure     1314 Anesthesia Start     1558 Anesthesia Stop          Responsible Staff  01/30/24      Name Role Begin End    Sloan Mack M.D. Anesth 1314 1552          Overtime Reason:  no overtime (within assigned shift)    Comments:

## 2024-01-31 NOTE — OR NURSING
Patient arrived to unit at 1653. Patient is AOx4. Transferred to chair appropriately. Patient's pain is 2/10. Declines pain medication at this time. Patient's dressing to left lower extremity is CDI. Tolerating liquids at this time. Discharge instructions discussed with the patient. Patient denies any further questions at this time. Patient discharged home to responsible adult at 1709.

## 2024-01-31 NOTE — ANESTHESIA POSTPROCEDURE EVALUATION
Patient: Jayjay Anders    Procedure Summary       Date: 01/30/24 Room / Location: [place] [address] / SURGERY [facility]    Anesthesia Start: 1314 Anesthesia Stop: 1558    Procedure: LEFT PATELLA FRACTURE SURGICAL REDUCTION AND FIXATION (Left: Knee) Diagnosis: (CLOSED FRACTURE OF LEFT PATELLA)    Surgeons: Cristobal Ledesma M.D. Responsible Provider: Sloan Mack M.D.    Anesthesia Type: general, peripheral nerve block ASA Status: 2            Final Anesthesia Type: general, peripheral nerve block  Last vitals  BP   Blood Pressure: (!) 169/89    Temp   36 °C (96.8 °F)    Pulse   70   Resp   20    SpO2   99 %      Anesthesia Post Evaluation    Patient location during evaluation: PACU  Patient participation: complete - patient participated  Level of consciousness: lethargic  Pain score: 0    Airway patency: patent  Anesthetic complications: no  Cardiovascular status: hemodynamically stable  Respiratory status: acceptable  Hydration status: euvolemic    PONV: none          No notable events documented.     Nurse Pain Score: 3 (NPRS)

## 2024-02-06 ENCOUNTER — ANESTHESIA EVENT (OUTPATIENT)
Dept: SURGERY | Facility: MEDICAL CENTER | Age: 59
DRG: 252 | End: 2024-02-06
Payer: COMMERCIAL

## 2024-02-06 ENCOUNTER — ANESTHESIA (OUTPATIENT)
Dept: SURGERY | Facility: MEDICAL CENTER | Age: 59
DRG: 252 | End: 2024-02-06
Payer: COMMERCIAL

## 2024-02-06 ENCOUNTER — APPOINTMENT (OUTPATIENT)
Dept: RADIOLOGY | Facility: MEDICAL CENTER | Age: 59
DRG: 252 | End: 2024-02-06
Attending: EMERGENCY MEDICINE
Payer: COMMERCIAL

## 2024-02-06 ENCOUNTER — APPOINTMENT (OUTPATIENT)
Dept: RADIOLOGY | Facility: MEDICAL CENTER | Age: 59
DRG: 252 | End: 2024-02-06
Attending: SURGERY
Payer: COMMERCIAL

## 2024-02-06 ENCOUNTER — APPOINTMENT (OUTPATIENT)
Dept: CARDIOLOGY | Facility: MEDICAL CENTER | Age: 59
DRG: 252 | End: 2024-02-06
Attending: HOSPITALIST
Payer: COMMERCIAL

## 2024-02-06 ENCOUNTER — HOSPITAL ENCOUNTER (INPATIENT)
Facility: MEDICAL CENTER | Age: 59
LOS: 1 days | DRG: 252 | End: 2024-02-07
Attending: EMERGENCY MEDICINE | Admitting: HOSPITALIST
Payer: COMMERCIAL

## 2024-02-06 ENCOUNTER — APPOINTMENT (OUTPATIENT)
Dept: RADIOLOGY | Facility: MEDICAL CENTER | Age: 59
DRG: 252 | End: 2024-02-06
Attending: HOSPITALIST
Payer: COMMERCIAL

## 2024-02-06 DIAGNOSIS — I70.90 ARTERIAL OCCLUSION: ICD-10-CM

## 2024-02-06 DIAGNOSIS — I74.9 ARTERIAL THROMBOSIS (HCC): ICD-10-CM

## 2024-02-06 DIAGNOSIS — Q24.8 INTERATRIAL CARDIAC SHUNT: ICD-10-CM

## 2024-02-06 DIAGNOSIS — I82.621 ARM DVT (DEEP VENOUS THROMBOEMBOLISM), ACUTE, RIGHT (HCC): ICD-10-CM

## 2024-02-06 DIAGNOSIS — S82.002D CLOSED DISPLACED FRACTURE OF LEFT PATELLA WITH ROUTINE HEALING, UNSPECIFIED FRACTURE MORPHOLOGY, SUBSEQUENT ENCOUNTER: ICD-10-CM

## 2024-02-06 DIAGNOSIS — I10 PRIMARY HYPERTENSION: ICD-10-CM

## 2024-02-06 DIAGNOSIS — I26.99 BILATERAL PULMONARY EMBOLISM (HCC): ICD-10-CM

## 2024-02-06 DIAGNOSIS — I82.4Z2 ACUTE DEEP VEIN THROMBOSIS (DVT) OF DISTAL VEIN OF LEFT LOWER EXTREMITY (HCC): ICD-10-CM

## 2024-02-06 PROBLEM — I82.402 LEFT LEG DVT (HCC): Status: ACTIVE | Noted: 2024-02-06

## 2024-02-06 PROBLEM — G47.30 SLEEP APNEA: Status: ACTIVE | Noted: 2024-02-06

## 2024-02-06 LAB
ALBUMIN SERPL BCP-MCNC: 4.1 G/DL (ref 3.2–4.9)
ALBUMIN/GLOB SERPL: 1.3 G/DL
ALP SERPL-CCNC: 71 U/L (ref 30–99)
ALT SERPL-CCNC: 18 U/L (ref 2–50)
ANION GAP SERPL CALC-SCNC: 16 MMOL/L (ref 7–16)
APPEARANCE UR: CLEAR
APTT PPP: 237.2 SEC (ref 24.7–36)
APTT PPP: 28.5 SEC (ref 24.7–36)
AST SERPL-CCNC: 25 U/L (ref 12–45)
BASOPHILS # BLD AUTO: 0.2 % (ref 0–1.8)
BASOPHILS # BLD: 0.03 K/UL (ref 0–0.12)
BILIRUB SERPL-MCNC: 1.5 MG/DL (ref 0.1–1.5)
BILIRUB UR QL STRIP.AUTO: NEGATIVE
BUN SERPL-MCNC: 17 MG/DL (ref 8–22)
CALCIUM ALBUM COR SERPL-MCNC: 9.2 MG/DL (ref 8.5–10.5)
CALCIUM SERPL-MCNC: 9.3 MG/DL (ref 8.5–10.5)
CHLORIDE SERPL-SCNC: 104 MMOL/L (ref 96–112)
CO2 SERPL-SCNC: 18 MMOL/L (ref 20–33)
COLOR UR: YELLOW
CREAT SERPL-MCNC: 0.87 MG/DL (ref 0.5–1.4)
EKG IMPRESSION: NORMAL
EOSINOPHIL # BLD AUTO: 0.18 K/UL (ref 0–0.51)
EOSINOPHIL NFR BLD: 1.2 % (ref 0–6.9)
ERYTHROCYTE [DISTWIDTH] IN BLOOD BY AUTOMATED COUNT: 38.3 FL (ref 35.9–50)
GFR SERPLBLD CREATININE-BSD FMLA CKD-EPI: 99 ML/MIN/1.73 M 2
GLOBULIN SER CALC-MCNC: 3.2 G/DL (ref 1.9–3.5)
GLUCOSE BLD STRIP.AUTO-MCNC: 146 MG/DL (ref 65–99)
GLUCOSE SERPL-MCNC: 142 MG/DL (ref 65–99)
GLUCOSE UR STRIP.AUTO-MCNC: NEGATIVE MG/DL
HCT VFR BLD AUTO: 41.6 % (ref 42–52)
HGB BLD-MCNC: 15 G/DL (ref 14–18)
IMM GRANULOCYTES # BLD AUTO: 0.06 K/UL (ref 0–0.11)
IMM GRANULOCYTES NFR BLD AUTO: 0.4 % (ref 0–0.9)
INR PPP: 1.03 (ref 0.87–1.13)
INR PPP: 1.16 (ref 0.87–1.13)
KETONES UR STRIP.AUTO-MCNC: NEGATIVE MG/DL
LEUKOCYTE ESTERASE UR QL STRIP.AUTO: NEGATIVE
LV EJECT FRACT  99904: 60
LYMPHOCYTES # BLD AUTO: 3.05 K/UL (ref 1–4.8)
LYMPHOCYTES NFR BLD: 19.9 % (ref 22–41)
MCH RBC QN AUTO: 31.8 PG (ref 27–33)
MCHC RBC AUTO-ENTMCNC: 36.1 G/DL (ref 32.3–36.5)
MCV RBC AUTO: 88.3 FL (ref 81.4–97.8)
MICRO URNS: NORMAL
MONOCYTES # BLD AUTO: 1.18 K/UL (ref 0–0.85)
MONOCYTES NFR BLD AUTO: 7.7 % (ref 0–13.4)
NEUTROPHILS # BLD AUTO: 10.8 K/UL (ref 1.82–7.42)
NEUTROPHILS NFR BLD: 70.6 % (ref 44–72)
NITRITE UR QL STRIP.AUTO: NEGATIVE
NRBC # BLD AUTO: 0 K/UL
NRBC BLD-RTO: 0 /100 WBC (ref 0–0.2)
PH UR STRIP.AUTO: 6.5 [PH] (ref 5–8)
PLATELET # BLD AUTO: 265 K/UL (ref 164–446)
PMV BLD AUTO: 9.4 FL (ref 9–12.9)
POTASSIUM SERPL-SCNC: 4 MMOL/L (ref 3.6–5.5)
PROT SERPL-MCNC: 7.3 G/DL (ref 6–8.2)
PROT UR QL STRIP: NEGATIVE MG/DL
PROTHROMBIN TIME: 13.6 SEC (ref 12–14.6)
PROTHROMBIN TIME: 15 SEC (ref 12–14.6)
RBC # BLD AUTO: 4.71 M/UL (ref 4.7–6.1)
RBC UR QL AUTO: NEGATIVE
SODIUM SERPL-SCNC: 138 MMOL/L (ref 135–145)
SP GR UR STRIP.AUTO: 1.04
UFH PPP CHRO-ACNC: 0.24 IU/ML
UFH PPP CHRO-ACNC: <0.1 IU/ML
UFH PPP CHRO-ACNC: >1.1 IU/ML
UROBILINOGEN UR STRIP.AUTO-MCNC: 0.2 MG/DL
WBC # BLD AUTO: 15.3 K/UL (ref 4.8–10.8)

## 2024-02-06 PROCEDURE — 99222 1ST HOSP IP/OBS MODERATE 55: CPT | Mod: 57 | Performed by: SURGERY

## 2024-02-06 PROCEDURE — 93970 EXTREMITY STUDY: CPT

## 2024-02-06 PROCEDURE — 85520 HEPARIN ASSAY: CPT

## 2024-02-06 PROCEDURE — 81003 URINALYSIS AUTO W/O SCOPE: CPT

## 2024-02-06 PROCEDURE — 700102 HCHG RX REV CODE 250 W/ 637 OVERRIDE(OP): Performed by: HOSPITALIST

## 2024-02-06 PROCEDURE — 700101 HCHG RX REV CODE 250: Performed by: STUDENT IN AN ORGANIZED HEALTH CARE EDUCATION/TRAINING PROGRAM

## 2024-02-06 PROCEDURE — 99223 1ST HOSP IP/OBS HIGH 75: CPT | Performed by: HOSPITALIST

## 2024-02-06 PROCEDURE — 93931 UPPER EXTREMITY STUDY: CPT | Mod: 26,LT | Performed by: INTERNAL MEDICINE

## 2024-02-06 PROCEDURE — 93005 ELECTROCARDIOGRAM TRACING: CPT | Performed by: EMERGENCY MEDICINE

## 2024-02-06 PROCEDURE — 770020 HCHG ROOM/CARE - TELE (206)

## 2024-02-06 PROCEDURE — 160039 HCHG SURGERY MINUTES - EA ADDL 1 MIN LEVEL 3: Performed by: SURGERY

## 2024-02-06 PROCEDURE — 36140 INTRO NDL ICATH UPR/LXTR ART: CPT | Mod: 59,RT | Performed by: SURGERY

## 2024-02-06 PROCEDURE — A9270 NON-COVERED ITEM OR SERVICE: HCPCS | Performed by: HOSPITALIST

## 2024-02-06 PROCEDURE — 160028 HCHG SURGERY MINUTES - 1ST 30 MINS LEVEL 3: Performed by: SURGERY

## 2024-02-06 PROCEDURE — 99254 IP/OBS CNSLTJ NEW/EST MOD 60: CPT | Performed by: INTERNAL MEDICINE

## 2024-02-06 PROCEDURE — 71275 CT ANGIOGRAPHY CHEST: CPT

## 2024-02-06 PROCEDURE — 96375 TX/PRO/DX INJ NEW DRUG ADDON: CPT

## 2024-02-06 PROCEDURE — 700105 HCHG RX REV CODE 258: Performed by: STUDENT IN AN ORGANIZED HEALTH CARE EDUCATION/TRAINING PROGRAM

## 2024-02-06 PROCEDURE — 75710 ARTERY X-RAYS ARM/LEG: CPT | Mod: 26,59,RT | Performed by: SURGERY

## 2024-02-06 PROCEDURE — 96374 THER/PROPH/DIAG INJ IV PUSH: CPT

## 2024-02-06 PROCEDURE — 700111 HCHG RX REV CODE 636 W/ 250 OVERRIDE (IP): Performed by: EMERGENCY MEDICINE

## 2024-02-06 PROCEDURE — 34101 REMOVAL OF ARTERY CLOT: CPT | Mod: RT | Performed by: SURGERY

## 2024-02-06 PROCEDURE — 700101 HCHG RX REV CODE 250: Performed by: SURGERY

## 2024-02-06 PROCEDURE — 160002 HCHG RECOVERY MINUTES (STAT): Performed by: SURGERY

## 2024-02-06 PROCEDURE — 36415 COLL VENOUS BLD VENIPUNCTURE: CPT

## 2024-02-06 PROCEDURE — 700111 HCHG RX REV CODE 636 W/ 250 OVERRIDE (IP): Mod: JZ

## 2024-02-06 PROCEDURE — 34111 REMOVAL OF ARM ARTERY CLOT: CPT | Mod: RT | Performed by: SURGERY

## 2024-02-06 PROCEDURE — 93306 TTE W/DOPPLER COMPLETE: CPT | Mod: 26 | Performed by: INTERNAL MEDICINE

## 2024-02-06 PROCEDURE — 93306 TTE W/DOPPLER COMPLETE: CPT

## 2024-02-06 PROCEDURE — C1894 INTRO/SHEATH, NON-LASER: HCPCS | Performed by: SURGERY

## 2024-02-06 PROCEDURE — 85610 PROTHROMBIN TIME: CPT

## 2024-02-06 PROCEDURE — 85025 COMPLETE CBC W/AUTO DIFF WBC: CPT

## 2024-02-06 PROCEDURE — 80053 COMPREHEN METABOLIC PANEL: CPT

## 2024-02-06 PROCEDURE — 160009 HCHG ANES TIME/MIN: Performed by: SURGERY

## 2024-02-06 PROCEDURE — 700117 HCHG RX CONTRAST REV CODE 255: Performed by: HOSPITALIST

## 2024-02-06 PROCEDURE — 99291 CRITICAL CARE FIRST HOUR: CPT

## 2024-02-06 PROCEDURE — 03C70ZZ EXTIRPATION OF MATTER FROM RIGHT BRACHIAL ARTERY, OPEN APPROACH: ICD-10-PCS | Performed by: SURGERY

## 2024-02-06 PROCEDURE — C1757 CATH, THROMBECTOMY/EMBOLECT: HCPCS | Performed by: SURGERY

## 2024-02-06 PROCEDURE — C1713 ANCHOR/SCREW BN/BN,TIS/BN: HCPCS | Performed by: SURGERY

## 2024-02-06 PROCEDURE — 160048 HCHG OR STATISTICAL LEVEL 1-5: Performed by: SURGERY

## 2024-02-06 PROCEDURE — 03C90ZZ EXTIRPATION OF MATTER FROM RIGHT ULNAR ARTERY, OPEN APPROACH: ICD-10-PCS | Performed by: SURGERY

## 2024-02-06 PROCEDURE — 85730 THROMBOPLASTIN TIME PARTIAL: CPT

## 2024-02-06 PROCEDURE — 700111 HCHG RX REV CODE 636 W/ 250 OVERRIDE (IP): Performed by: SURGERY

## 2024-02-06 PROCEDURE — 700117 HCHG RX CONTRAST REV CODE 255: Performed by: SURGERY

## 2024-02-06 PROCEDURE — 700111 HCHG RX REV CODE 636 W/ 250 OVERRIDE (IP): Performed by: ANESTHESIOLOGY

## 2024-02-06 PROCEDURE — 82962 GLUCOSE BLOOD TEST: CPT

## 2024-02-06 PROCEDURE — 700111 HCHG RX REV CODE 636 W/ 250 OVERRIDE (IP): Performed by: STUDENT IN AN ORGANIZED HEALTH CARE EDUCATION/TRAINING PROGRAM

## 2024-02-06 PROCEDURE — 03C50ZZ EXTIRPATION OF MATTER FROM RIGHT AXILLARY ARTERY, OPEN APPROACH: ICD-10-PCS | Performed by: SURGERY

## 2024-02-06 PROCEDURE — 93970 EXTREMITY STUDY: CPT | Mod: 26 | Performed by: INTERNAL MEDICINE

## 2024-02-06 PROCEDURE — 75774 ARTERY X-RAY EACH VESSEL: CPT | Mod: RT

## 2024-02-06 PROCEDURE — 160036 HCHG PACU - EA ADDL 30 MINS PHASE I: Performed by: SURGERY

## 2024-02-06 PROCEDURE — 03CB0ZZ EXTIRPATION OF MATTER FROM RIGHT RADIAL ARTERY, OPEN APPROACH: ICD-10-PCS | Performed by: SURGERY

## 2024-02-06 PROCEDURE — 93931 UPPER EXTREMITY STUDY: CPT | Mod: RT

## 2024-02-06 PROCEDURE — 160035 HCHG PACU - 1ST 60 MINS PHASE I: Performed by: SURGERY

## 2024-02-06 RX ORDER — HYDROMORPHONE HYDROCHLORIDE 1 MG/ML
0.2 INJECTION, SOLUTION INTRAMUSCULAR; INTRAVENOUS; SUBCUTANEOUS
Status: DISCONTINUED | OUTPATIENT
Start: 2024-02-06 | End: 2024-02-06 | Stop reason: HOSPADM

## 2024-02-06 RX ORDER — MIDAZOLAM HYDROCHLORIDE 1 MG/ML
INJECTION INTRAMUSCULAR; INTRAVENOUS PRN
Status: DISCONTINUED | OUTPATIENT
Start: 2024-02-06 | End: 2024-02-06 | Stop reason: SURG

## 2024-02-06 RX ORDER — OXYCODONE AND ACETAMINOPHEN 10; 325 MG/1; MG/1
.5-1 TABLET ORAL EVERY 4 HOURS PRN
Status: DISCONTINUED | OUTPATIENT
Start: 2024-02-06 | End: 2024-02-07 | Stop reason: HOSPADM

## 2024-02-06 RX ORDER — DIPHENHYDRAMINE HYDROCHLORIDE 50 MG/ML
12.5 INJECTION INTRAMUSCULAR; INTRAVENOUS
Status: DISCONTINUED | OUTPATIENT
Start: 2024-02-06 | End: 2024-02-06 | Stop reason: HOSPADM

## 2024-02-06 RX ORDER — HYDROMORPHONE HYDROCHLORIDE 1 MG/ML
0.1 INJECTION, SOLUTION INTRAMUSCULAR; INTRAVENOUS; SUBCUTANEOUS
Status: DISCONTINUED | OUTPATIENT
Start: 2024-02-06 | End: 2024-02-06 | Stop reason: HOSPADM

## 2024-02-06 RX ORDER — ACETAMINOPHEN 325 MG/1
650 TABLET ORAL EVERY 6 HOURS PRN
Status: DISCONTINUED | OUTPATIENT
Start: 2024-02-06 | End: 2024-02-07 | Stop reason: HOSPADM

## 2024-02-06 RX ORDER — PROCHLORPERAZINE EDISYLATE 5 MG/ML
5-10 INJECTION INTRAMUSCULAR; INTRAVENOUS EVERY 4 HOURS PRN
Status: DISCONTINUED | OUTPATIENT
Start: 2024-02-06 | End: 2024-02-07 | Stop reason: HOSPADM

## 2024-02-06 RX ORDER — MEPERIDINE HYDROCHLORIDE 25 MG/ML
6.25 INJECTION INTRAMUSCULAR; INTRAVENOUS; SUBCUTANEOUS
Status: DISCONTINUED | OUTPATIENT
Start: 2024-02-06 | End: 2024-02-06 | Stop reason: HOSPADM

## 2024-02-06 RX ORDER — ONDANSETRON 2 MG/ML
4 INJECTION INTRAMUSCULAR; INTRAVENOUS EVERY 4 HOURS PRN
Status: DISCONTINUED | OUTPATIENT
Start: 2024-02-06 | End: 2024-02-07 | Stop reason: HOSPADM

## 2024-02-06 RX ORDER — TRAVOPROST OPHTHALMIC SOLUTION 0.04 MG/ML
1 SOLUTION OPHTHALMIC DAILY
Status: DISCONTINUED | OUTPATIENT
Start: 2024-02-06 | End: 2024-02-06

## 2024-02-06 RX ORDER — PROMETHAZINE HYDROCHLORIDE 25 MG/1
12.5-25 SUPPOSITORY RECTAL EVERY 4 HOURS PRN
Status: DISCONTINUED | OUTPATIENT
Start: 2024-02-06 | End: 2024-02-07 | Stop reason: HOSPADM

## 2024-02-06 RX ORDER — CEFAZOLIN SODIUM 1 G/3ML
INJECTION, POWDER, FOR SOLUTION INTRAMUSCULAR; INTRAVENOUS PRN
Status: DISCONTINUED | OUTPATIENT
Start: 2024-02-06 | End: 2024-02-06 | Stop reason: SURG

## 2024-02-06 RX ORDER — MORPHINE SULFATE 4 MG/ML
4 INJECTION INTRAVENOUS ONCE
Status: COMPLETED | OUTPATIENT
Start: 2024-02-06 | End: 2024-02-06

## 2024-02-06 RX ORDER — HALOPERIDOL 5 MG/ML
1 INJECTION INTRAMUSCULAR
Status: DISCONTINUED | OUTPATIENT
Start: 2024-02-06 | End: 2024-02-06 | Stop reason: HOSPADM

## 2024-02-06 RX ORDER — PAPAVERINE HYDROCHLORIDE 30 MG/ML
INJECTION INTRAMUSCULAR; INTRAVENOUS
Status: DISCONTINUED | OUTPATIENT
Start: 2024-02-06 | End: 2024-02-06 | Stop reason: HOSPADM

## 2024-02-06 RX ORDER — AMOXICILLIN 250 MG
2 CAPSULE ORAL EVERY EVENING
Status: DISCONTINUED | OUTPATIENT
Start: 2024-02-06 | End: 2024-02-07 | Stop reason: HOSPADM

## 2024-02-06 RX ORDER — HEPARIN SODIUM 1000 [USP'U]/ML
80 INJECTION, SOLUTION INTRAVENOUS; SUBCUTANEOUS ONCE
Status: DISCONTINUED | OUTPATIENT
Start: 2024-02-06 | End: 2024-02-06

## 2024-02-06 RX ORDER — HYDROMORPHONE HYDROCHLORIDE 1 MG/ML
0.5 INJECTION, SOLUTION INTRAMUSCULAR; INTRAVENOUS; SUBCUTANEOUS
Status: DISCONTINUED | OUTPATIENT
Start: 2024-02-06 | End: 2024-02-06 | Stop reason: HOSPADM

## 2024-02-06 RX ORDER — HYDROCHLOROTHIAZIDE 25 MG/1
12.5 TABLET ORAL
Status: DISCONTINUED | OUTPATIENT
Start: 2024-02-06 | End: 2024-02-07 | Stop reason: HOSPADM

## 2024-02-06 RX ORDER — OXYCODONE HCL 5 MG/5 ML
5 SOLUTION, ORAL ORAL
Status: DISCONTINUED | OUTPATIENT
Start: 2024-02-06 | End: 2024-02-06 | Stop reason: HOSPADM

## 2024-02-06 RX ORDER — HEPARIN SODIUM 5000 [USP'U]/100ML
0-30 INJECTION, SOLUTION INTRAVENOUS CONTINUOUS
Status: DISCONTINUED | OUTPATIENT
Start: 2024-02-06 | End: 2024-02-07

## 2024-02-06 RX ORDER — OXYCODONE HCL 5 MG/5 ML
10 SOLUTION, ORAL ORAL
Status: DISCONTINUED | OUTPATIENT
Start: 2024-02-06 | End: 2024-02-06 | Stop reason: HOSPADM

## 2024-02-06 RX ORDER — LIDOCAINE HYDROCHLORIDE 20 MG/ML
INJECTION, SOLUTION EPIDURAL; INFILTRATION; INTRACAUDAL; PERINEURAL PRN
Status: DISCONTINUED | OUTPATIENT
Start: 2024-02-06 | End: 2024-02-06 | Stop reason: SURG

## 2024-02-06 RX ORDER — ONDANSETRON 2 MG/ML
4 INJECTION INTRAMUSCULAR; INTRAVENOUS
Status: DISCONTINUED | OUTPATIENT
Start: 2024-02-06 | End: 2024-02-06 | Stop reason: HOSPADM

## 2024-02-06 RX ORDER — LISINOPRIL AND HYDROCHLOROTHIAZIDE 12.5; 1 MG/1; MG/1
1 TABLET ORAL DAILY
Status: DISCONTINUED | OUTPATIENT
Start: 2024-02-06 | End: 2024-02-06

## 2024-02-06 RX ORDER — POLYETHYLENE GLYCOL 3350 17 G/17G
1 POWDER, FOR SOLUTION ORAL
Status: DISCONTINUED | OUTPATIENT
Start: 2024-02-06 | End: 2024-02-07 | Stop reason: HOSPADM

## 2024-02-06 RX ORDER — HYDRALAZINE HYDROCHLORIDE 20 MG/ML
5 INJECTION INTRAMUSCULAR; INTRAVENOUS
Status: DISCONTINUED | OUTPATIENT
Start: 2024-02-06 | End: 2024-02-06 | Stop reason: HOSPADM

## 2024-02-06 RX ORDER — HEPARIN SODIUM 5000 [USP'U]/100ML
0-30 INJECTION, SOLUTION INTRAVENOUS CONTINUOUS
Status: DISCONTINUED | OUTPATIENT
Start: 2024-02-06 | End: 2024-02-06

## 2024-02-06 RX ORDER — IODIXANOL 270 MG/ML
INJECTION, SOLUTION INTRAVASCULAR
Status: DISCONTINUED | OUTPATIENT
Start: 2024-02-06 | End: 2024-02-06 | Stop reason: HOSPADM

## 2024-02-06 RX ORDER — LISINOPRIL 10 MG/1
10 TABLET ORAL
Status: DISCONTINUED | OUTPATIENT
Start: 2024-02-06 | End: 2024-02-07 | Stop reason: HOSPADM

## 2024-02-06 RX ORDER — HEPARIN SODIUM 1000 [USP'U]/ML
40 INJECTION, SOLUTION INTRAVENOUS; SUBCUTANEOUS PRN
Status: DISCONTINUED | OUTPATIENT
Start: 2024-02-06 | End: 2024-02-06

## 2024-02-06 RX ORDER — ONDANSETRON 4 MG/1
4 TABLET, ORALLY DISINTEGRATING ORAL EVERY 4 HOURS PRN
Status: DISCONTINUED | OUTPATIENT
Start: 2024-02-06 | End: 2024-02-07 | Stop reason: HOSPADM

## 2024-02-06 RX ORDER — MORPHINE SULFATE 4 MG/ML
INJECTION INTRAVENOUS
Status: COMPLETED
Start: 2024-02-06 | End: 2024-02-06

## 2024-02-06 RX ORDER — EPHEDRINE SULFATE 50 MG/ML
5 INJECTION, SOLUTION INTRAVENOUS
Status: DISCONTINUED | OUTPATIENT
Start: 2024-02-06 | End: 2024-02-06 | Stop reason: HOSPADM

## 2024-02-06 RX ORDER — ONDANSETRON 2 MG/ML
INJECTION INTRAMUSCULAR; INTRAVENOUS PRN
Status: DISCONTINUED | OUTPATIENT
Start: 2024-02-06 | End: 2024-02-06 | Stop reason: SURG

## 2024-02-06 RX ORDER — SODIUM CHLORIDE, SODIUM LACTATE, POTASSIUM CHLORIDE, CALCIUM CHLORIDE 600; 310; 30; 20 MG/100ML; MG/100ML; MG/100ML; MG/100ML
INJECTION, SOLUTION INTRAVENOUS CONTINUOUS
Status: DISCONTINUED | OUTPATIENT
Start: 2024-02-06 | End: 2024-02-06 | Stop reason: HOSPADM

## 2024-02-06 RX ORDER — ACETAMINOPHEN 325 MG/1
650 TABLET ORAL EVERY 6 HOURS PRN
Status: DISCONTINUED | OUTPATIENT
Start: 2024-02-06 | End: 2024-02-06

## 2024-02-06 RX ORDER — LABETALOL HYDROCHLORIDE 5 MG/ML
10 INJECTION, SOLUTION INTRAVENOUS EVERY 4 HOURS PRN
Status: DISCONTINUED | OUTPATIENT
Start: 2024-02-06 | End: 2024-02-07 | Stop reason: HOSPADM

## 2024-02-06 RX ORDER — BUPIVACAINE HYDROCHLORIDE AND EPINEPHRINE 5; 5 MG/ML; UG/ML
INJECTION, SOLUTION PERINEURAL
Status: DISCONTINUED | OUTPATIENT
Start: 2024-02-06 | End: 2024-02-06 | Stop reason: HOSPADM

## 2024-02-06 RX ORDER — HEPARIN SODIUM,PORCINE 1000/ML
VIAL (ML) INJECTION
Status: DISCONTINUED | OUTPATIENT
Start: 2024-02-06 | End: 2024-02-06

## 2024-02-06 RX ORDER — LATANOPROST 50 UG/ML
1 SOLUTION/ DROPS OPHTHALMIC EVERY EVENING
Status: DISCONTINUED | OUTPATIENT
Start: 2024-02-06 | End: 2024-02-07 | Stop reason: HOSPADM

## 2024-02-06 RX ORDER — HEPARIN SODIUM 1000 [USP'U]/ML
INJECTION, SOLUTION INTRAVENOUS; SUBCUTANEOUS PRN
Status: DISCONTINUED | OUTPATIENT
Start: 2024-02-06 | End: 2024-02-06 | Stop reason: SURG

## 2024-02-06 RX ORDER — PHENYLEPHRINE HCL IN 0.9% NACL 0.5 MG/5ML
SYRINGE (ML) INTRAVENOUS PRN
Status: DISCONTINUED | OUTPATIENT
Start: 2024-02-06 | End: 2024-02-06 | Stop reason: SURG

## 2024-02-06 RX ORDER — PROMETHAZINE HYDROCHLORIDE 25 MG/1
12.5-25 TABLET ORAL EVERY 4 HOURS PRN
Status: DISCONTINUED | OUTPATIENT
Start: 2024-02-06 | End: 2024-02-07 | Stop reason: HOSPADM

## 2024-02-06 RX ORDER — HEPARIN SODIUM 1000 [USP'U]/ML
80 INJECTION, SOLUTION INTRAVENOUS; SUBCUTANEOUS ONCE
Status: COMPLETED | OUTPATIENT
Start: 2024-02-06 | End: 2024-02-06

## 2024-02-06 RX ORDER — SODIUM CHLORIDE, SODIUM LACTATE, POTASSIUM CHLORIDE, CALCIUM CHLORIDE 600; 310; 30; 20 MG/100ML; MG/100ML; MG/100ML; MG/100ML
INJECTION, SOLUTION INTRAVENOUS
Status: DISCONTINUED | OUTPATIENT
Start: 2024-02-06 | End: 2024-02-06 | Stop reason: SURG

## 2024-02-06 RX ORDER — DEXAMETHASONE SODIUM PHOSPHATE 4 MG/ML
INJECTION, SOLUTION INTRA-ARTICULAR; INTRALESIONAL; INTRAMUSCULAR; INTRAVENOUS; SOFT TISSUE PRN
Status: DISCONTINUED | OUTPATIENT
Start: 2024-02-06 | End: 2024-02-06 | Stop reason: SURG

## 2024-02-06 RX ADMIN — LATANOPROST 1 DROP: 50 SOLUTION OPHTHALMIC at 17:30

## 2024-02-06 RX ADMIN — IOHEXOL 55 ML: 350 INJECTION, SOLUTION INTRAVENOUS at 11:10

## 2024-02-06 RX ADMIN — LIDOCAINE HYDROCHLORIDE 100 MG: 20 INJECTION, SOLUTION EPIDURAL; INFILTRATION; INTRACAUDAL at 07:02

## 2024-02-06 RX ADMIN — Medication 100 MCG: at 07:58

## 2024-02-06 RX ADMIN — FENTANYL CITRATE 50 MCG: 50 INJECTION, SOLUTION INTRAMUSCULAR; INTRAVENOUS at 06:54

## 2024-02-06 RX ADMIN — ACETAMINOPHEN 650 MG: 325 TABLET, FILM COATED ORAL at 19:19

## 2024-02-06 RX ADMIN — HEPARIN SODIUM 5000 UNITS: 1000 INJECTION, SOLUTION INTRAVENOUS; SUBCUTANEOUS at 07:22

## 2024-02-06 RX ADMIN — HEPARIN SODIUM 7500 UNITS: 1000 INJECTION, SOLUTION INTRAVENOUS; SUBCUTANEOUS at 06:14

## 2024-02-06 RX ADMIN — MORPHINE SULFATE 4 MG: 4 INJECTION, SOLUTION INTRAMUSCULAR; INTRAVENOUS at 06:45

## 2024-02-06 RX ADMIN — CEFAZOLIN 2 G: 1 INJECTION, POWDER, FOR SOLUTION INTRAMUSCULAR; INTRAVENOUS at 07:05

## 2024-02-06 RX ADMIN — HUMAN ALBUMIN MICROSPHERES AND PERFLUTREN 3 ML: 10; .22 INJECTION, SOLUTION INTRAVENOUS at 12:52

## 2024-02-06 RX ADMIN — ONDANSETRON 4 MG: 2 INJECTION INTRAMUSCULAR; INTRAVENOUS at 07:47

## 2024-02-06 RX ADMIN — LISINOPRIL 10 MG: 10 TABLET ORAL at 13:46

## 2024-02-06 RX ADMIN — DEXAMETHASONE SODIUM PHOSPHATE 4 MG: 4 INJECTION INTRA-ARTICULAR; INTRALESIONAL; INTRAMUSCULAR; INTRAVENOUS; SOFT TISSUE at 07:05

## 2024-02-06 RX ADMIN — FENTANYL CITRATE 50 MCG: 50 INJECTION, SOLUTION INTRAMUSCULAR; INTRAVENOUS at 07:12

## 2024-02-06 RX ADMIN — HYDROCHLOROTHIAZIDE 12.5 MG: 25 TABLET ORAL at 13:46

## 2024-02-06 RX ADMIN — Medication 150 MCG: at 07:53

## 2024-02-06 RX ADMIN — MIDAZOLAM HYDROCHLORIDE 2 MG: 1 INJECTION, SOLUTION INTRAMUSCULAR; INTRAVENOUS at 06:54

## 2024-02-06 RX ADMIN — PROPOFOL 150 MG: 10 INJECTION, EMULSION INTRAVENOUS at 07:02

## 2024-02-06 RX ADMIN — HEPARIN SODIUM 18 UNITS/KG/HR: 5000 INJECTION, SOLUTION INTRAVENOUS at 06:18

## 2024-02-06 RX ADMIN — SODIUM CHLORIDE, POTASSIUM CHLORIDE, SODIUM LACTATE AND CALCIUM CHLORIDE: 600; 310; 30; 20 INJECTION, SOLUTION INTRAVENOUS at 06:54

## 2024-02-06 RX ADMIN — MORPHINE SULFATE 4 MG: 4 INJECTION INTRAVENOUS at 06:45

## 2024-02-06 ASSESSMENT — ENCOUNTER SYMPTOMS
ABDOMINAL PAIN: 0
WEIGHT LOSS: 0
PALPITATIONS: 0
NAUSEA: 0
HEADACHES: 0
SORE THROAT: 0
MYALGIAS: 1
SENSORY CHANGE: 1
NERVOUS/ANXIOUS: 0
SHORTNESS OF BREATH: 1
WEAKNESS: 1
COUGH: 0
BLOOD IN STOOL: 0

## 2024-02-06 ASSESSMENT — FIBROSIS 4 INDEX: FIB4 SCORE: 0.91

## 2024-02-06 ASSESSMENT — PAIN DESCRIPTION - PAIN TYPE: TYPE: ACUTE PAIN

## 2024-02-06 NOTE — PROGRESS NOTES
Vascular surgery.    Large amount of fresh thrombus was evacuated from the right arm.  With patient having recent leg surgery and no history of arrhythmia, paradoxical embolism is a possibility.    Recommend:  1) Lower extremity venous duplex to rule out DVT.  2) Echocardiogram to check for PFO and cardiac thrombus.  3) Continue anticoagulation with heparin weight-based protocol, no reboluses to avoid bleeding.    Discussed with Dr. Gong.

## 2024-02-06 NOTE — PROGRESS NOTES
Vascular Surgery.    To OR for emergent right arm thrombectomy and angiogram.  All questions were answered.

## 2024-02-06 NOTE — PROGRESS NOTES
Vascular surgery.    Called by RN for small hematoma.  On physical exam, there is a small soft hematoma underneath the incision.  The right distal radial and ulnar pulses are palpable.  The sensorimotor function of the right hand is intact.    Plan:  I wrapped the right forearm with Ace wrap.  Keep right arm straight and elevated.  Hold heparin for 2 hours.  No boluses when restart heparin to avoid bleeding complication.    Discussed with Dr. Anders.  All questions were answered.    Discussed with RN.

## 2024-02-06 NOTE — CONSULTS
VASCULAR SURGERY SERVICE  CONSULT NOTE      Date: 2/6/2024    Referring Provider: Jeannette Weaver MD  Consulting Physician: Raphael Orozco MD - [facility]     -------------------------------------------------------------------------------------------------    Reason for consultation:  Right arm ischemia.    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  I was consulted and recommended that patient be started on weight-based heparin protocol and came in to see patient.    Of note, patient has history of left patella surgery about 7 days ago.    Past Medical History:   Diagnosis Date    Cancer (HCC) 09/22/2022    melanoma    Glaucoma     Hypertension     Melanoma of back (HCC) 09/26/2022    Pain     left knee    Sleep apnea     Snoring        Past Surgical History:   Procedure Laterality Date    PB OPEN RX PATELLA FX Left 1/30/2024    Procedure: LEFT PATELLA FRACTURE SURGICAL REDUCTION AND FIXATION;  Surgeon: Cristobal Ledesma M.D.;  Location: SURGERY [facility];  Service: Orthopedics    WIDE EXCISION, LESION, TORSO Left 9/26/2022    Procedure: RADICAL WIDE EXCISION OF MALIGNANT MELANOMA OF LEFT UPPER BACK WITH LAYERED CLOSURE;  Surgeon: Fidel Archer M.D.;  Location: SURGERY [facility];  Service: General    WOUND CLOSURE NEURO Left 9/26/2022    Procedure: CLOSURE, WOUND;  Surgeon: Fidel Archer M.D.;  Location: SURGERY [facility];  Service: General    APPENDECTOMY      as child       Current Facility-Administered Medications   Medication Dose Route Frequency Provider Last Rate Last Admin    acetaminophen (Tylenol) tablet 650 mg  650 mg Oral Q6HRS PRN Virginia Da Silva M.D.        labetalol (Normodyne/Trandate) injection 10 mg  10 mg Intravenous Q4HRS PRN Virginia Da Silva M.D.        lactated ringers infusion   Intravenous Continuous Clint Muñiz M.D.        HYDROmorphone (Dilaudid) injection 0.1 mg  0.1 mg  Intravenous Q10 MIN PRN Clint Muñiz M.D.        Or    HYDROmorphone (Dilaudid) injection 0.2 mg  0.2 mg Intravenous Q10 MIN PRN Clint Muñiz M.D.        Or    HYDROmorphone (Dilaudid) injection 0.5 mg  0.5 mg Intravenous Q10 MIN PRN Clint Muñiz M.D.        oxyCODONE (Roxicodone) oral solution 5 mg  5 mg Oral Once PRN Clint Muñiz M.D.        Or    oxyCODONE (Roxicodone) oral solution 10 mg  10 mg Oral Once PRN Clint Muñiz M.D.        meperidine (Demerol) injection 6.25 mg  6.25 mg Intravenous Q5 MIN PRN Clint Muñiz M.D.        ondansetron (Zofran) syringe/vial injection 4 mg  4 mg Intravenous Once PRN Clint Muñiz M.D.        haloperidol lactate (Haldol) injection 1 mg  1 mg Intravenous Q15 MIN PRN Clint Muñiz M.D.        diphenhydrAMINE (Benadryl) injection 12.5 mg  12.5 mg Intravenous Q15 MIN PRN Clint Muñiz M.D.        ePHEDrine injection 5 mg  5 mg Intravenous Q5 MIN PRN Clint Muñiz M.D.        hydrALAZINE (Apresoline) injection 5 mg  5 mg Intravenous Q5 MIN PRN Clint Muñiz M.D.        albuterol (Proventil) 2.5mg/0.5ml nebulizer solution 2.5 mg  2.5 mg Inhalation Q10 MIN PRN Clint Muñiz M.D.        lisinopril-hydrochlorothiazide (Prinzide) 10-12.5 MG per tablet 1 Tablet  1 Tablet Oral DAILY Harley Gong D.O.        oxyCODONE-acetaminophen (Percocet-10)  MG per tablet 0.5-1 Tablet  0.5-1 Tablet Oral Q4HRS PRN Harley Gong D.O.        travoprost (Travatan Z) 0.004 % ophthalmic solution 1 Drop  1 Drop Both Eyes DAILY Harley W. Beltran, D.O.        acetaminophen (Tylenol) tablet 650 mg  650 mg Oral Q6HRS PRN KASSI BarnettO.        senna-docusate (Pericolace Or Senokot S) 8.6-50 MG per tablet 2 Tablet  2 Tablet Oral Q EVENING Harley Gong D.O.        And    polyethylene glycol/lytes (Miralax) Packet 1 Packet  1 Packet Oral QDAY PRN Harley Gong D.O.        labetalol (Normodyne/Trandate) injection 10 mg  10 mg  Intravenous Q4HRS PRN Harley Gong D.O.        ondansetron (Zofran) syringe/vial injection 4 mg  4 mg Intravenous Q4HRS PRN Harley Gong D.O.        ondansetron (Zofran ODT) dispertab 4 mg  4 mg Oral Q4HRS PRN Harley Gong D.O.        promethazine (Phenergan) tablet 12.5-25 mg  12.5-25 mg Oral Q4HRS PRN Harley Gong D.O.        promethazine (Phenergan) suppository 12.5-25 mg  12.5-25 mg Rectal Q4HRS PRN Harley Gong D.O.        prochlorperazine (Compazine) injection 5-10 mg  5-10 mg Intravenous Q4HRS PRN Harley Gong D.O.        heparin infusion 25,000 units in 500 mL 0.45% NACL  0-30 Units/kg/hr (Adjusted) Intravenous Continuous Harley Gong D.O.           Social History     Socioeconomic History    Marital status:      Spouse name: Not on file    Number of children: Not on file    Years of education: Not on file    Highest education level: Not on file   Occupational History    Not on file   Tobacco Use    Smoking status: Never     Passive exposure: Past    Smokeless tobacco: Never   Vaping Use    Vaping Use: Never used   Substance and Sexual Activity    Alcohol use: Yes     Comment: 2-3 BEERS A MONTH    Drug use: Never    Sexual activity: Not on file   Other Topics Concern    Not on file   Social History Narrative    Not on file     Social Determinants of Health     Financial Resource Strain: Not on file   Food Insecurity: Not on file   Transportation Needs: Not on file   Physical Activity: Not on file   Stress: Not on file   Social Connections: Not on file   Intimate Partner Violence: Not on file   Housing Stability: Not on file       History reviewed. No pertinent family history.    Allergies:  Patient has no known allergies.    Review of Systems:    Constitutional: Negative for fever, chills, weight loss,   HENT:   Negative for hearing loss or tinnitus    Eyes:    Negative for blurred vision, double vision, or loss of vision  Respiratory:  Negative for cough, hemoptysis, or wheezing   "  Cardiac:  Negative for chest pain or palpitations or orthopnea  Vascular:  Positive for right arm pain   Gastrointestinal: Negative for nausea, vomiting, or abdominal pain     Negative for hematochezia or melena   Genitourinary: Negative for dysuria, frequency, or hematuria   Musculoskeletal: Negative for myalgias, back pain, or joint pain  Skin:   Negative for itching or rash  Neurological:  Negative for dizziness, headaches, or tremors     Negative for speech disturbance     Negative for extremity weakness or paresthesias  Endo/Heme:  Negative for easy bruising or bleeding  Psychiatric:  Negative for depression, suicidal ideas, or hallucinations    Physical Exam:  BP (!) 172/112   Pulse 75   Temp 36.1 °C (97 °F) (Temporal)   Resp 18   Ht 1.854 m (6' 1\")   Wt 116 kg (255 lb)   SpO2 93%     Constitutional: Alert, oriented, no acute distress  HEENT:  Normocephalic and atraumatic, EOMI  Neck:   Supple, no JVD.  Palpable carotid pulses bilaterally, symmetrical.  Cardiovascular: Regular rate and rhythm  Pulmonary:  Good air entry bilaterally  Abdominal:  Soft, non-tender, non-distended     Aortic impulse not widened  Musculoskeletal: No edema, no tenderness  Neurological:  CN II-XII grossly intact.  Decreased sensation in right hand but able to move fingers.  Skin:   Skin is warm and dry. No rash noted.  Psychiatric:  Normal mood and affect.  Upper extremities:    Palpable left radial pulses with multiphasic flow.  Right radial and ulnar pulses are not palpable.  Right forearm is cyanotic from the elbow to the fingers.  Lower extremities:    Splint in place on left leg.  1+ left leg edema.  Palpable bilateral pedal pulses with multiphasic flow.    Labs:  Recent Labs     02/06/24  0554   WBC 15.3*   RBC 4.71   HEMOGLOBIN 15.0   HEMATOCRIT 41.6*   MCV 88.3   MCH 31.8   MCHC 36.1   RDW 38.3   PLATELETCT 265   MPV 9.4     Recent Labs     02/06/24  0554   SODIUM 138   POTASSIUM 4.0   CHLORIDE 104   CO2 18*   GLUCOSE " 142*   BUN 17   CREATININE 0.87   CALCIUM 9.3     Recent Labs     02/06/24  0554   APTT 28.5   INR 1.03     Recent Labs     02/06/24  0554   ASTSGOT 25   ALTSGPT 18   TBILIRUBIN 1.5   ALKPHOSPHAT 71   GLOBULIN 3.2   INR 1.03       Radiology:  Noninvasive vascular studies show thrombus from right axillary artery down to the brachial arteries.    Assessment:  -Right arm ischemia.  -Hypertension.    Plan:  I had a long discussion with Dr. Anders.  I recommended that he undergoes right arm thrombectomy and angiogram.  Risks and benefits were discussed.  Dr. Anders indicated understanding and would like to proceed.  All questions were answered.  OR was contacted.      Raphael Orozco MD  [facility]   [place] preferred or call my office [phone]  __________________________________________________________________  Patient:Jayjay Sabino Anders   MRN:[id number]   CSN:[id number]

## 2024-02-06 NOTE — OR NURSING
Echo beverly Durham at bedside for cardiac echo as ordered.  Dr Orozco also at bedside to discuss patient status and plan

## 2024-02-06 NOTE — PROCEDURES
"CARDIAC CONSULTATION    Requesting Provider: Harley Gong D.O.  Reason for consultation: Paradoxical embolus    Impressions:  #. Paradoxical embolus   - presumed PFO   - s/p embolectomy of right brachial  #. Provoked DVT/PE due to left patella fracture    Recommendations:  DOMINIC - planned tomorrow at 930  Continue anticoagulation  Can do a zio at DC      Will follow    History: Jayjay Anders is a 58 y.o. male who I been consulted regarding paradoxical embolism.  The patient suffered a patella fracture and underwent surgery on January 30.  He had been experiencing shortness of breath over the past several days and ultimately developed pain and discoloration of the right hand today.  He presented to the emergency room and was diagnosed with DVT/PE as well as brachial artery thrombus which has subsequently been removed    ROS:   10 point review systems is otherwise negative except as per the HPI    PE:  /84   Pulse 64   Temp 36.6 °C (97.8 °F) (Temporal)   Resp 18   Ht 1.854 m (6' 1\")   Wt 116 kg (255 lb)   SpO2 98%   BMI 33.64 kg/m²   GEN: NAD  RESP: CTAB  CVS: RRR, No M/R/G  ABD: Soft, NT/ND  EXT: WWP, no edema         Past Medical History:   Diagnosis Date    Cancer (HCC) 09/22/2022    melanoma    Glaucoma     Hypertension     Melanoma of back (HCC) 09/26/2022    Pain     left knee    Sleep apnea     Snoring      Past Surgical History:   Procedure Laterality Date    THROMBECTOMY Right 2/6/2024    Procedure: THROMBECTOMY, ARM;  Surgeon: Raphael Orozco M.D.;  Location: [facility];  Service: General    ANGIOGRAM  2/6/2024    Procedure: ANGIOGRAM;  Surgeon: Raphael Orozco M.D.;  Location: [facility];  Service: General    PB OPEN RX PATELLA FX Left 1/30/2024    Procedure: LEFT PATELLA FRACTURE SURGICAL REDUCTION AND FIXATION;  Surgeon: Cristobal Ledesma M.D.;  Location: [facility];  Service: Orthopedics    WIDE EXCISION, LESION, TORSO Left 9/26/2022    Procedure: RADICAL " WIDE EXCISION OF MALIGNANT MELANOMA OF LEFT UPPER BACK WITH LAYERED CLOSURE;  Surgeon: Fidel Archer M.D.;  Location: SURGERY [facility];  Service: General    WOUND CLOSURE NEURO Left 9/26/2022    Procedure: CLOSURE, WOUND;  Surgeon: Fidel Archer M.D.;  Location: SURGERY [facility];  Service: General    APPENDECTOMY      as child     No Known Allergies    Current Facility-Administered Medications:     acetaminophen (Tylenol) tablet 650 mg, 650 mg, Oral, Q6HRS PRN, Virginia Da Silva M.D.    labetalol (Normodyne/Trandate) injection 10 mg, 10 mg, Intravenous, Q4HRS PRN, Virginia Da Silva M.D.    oxyCODONE-acetaminophen (Percocet-10)  MG per tablet 0.5-1 Tablet, 0.5-1 Tablet, Oral, Q4HRS PRN, Harley Gong D.O.    acetaminophen (Tylenol) tablet 650 mg, 650 mg, Oral, Q6HRS PRN, Harley Gong D.O.    senna-docusate (Pericolace Or Senokot S) 8.6-50 MG per tablet 2 Tablet, 2 Tablet, Oral, Q EVENING **AND** polyethylene glycol/lytes (Miralax) Packet 1 Packet, 1 Packet, Oral, QDAY PRN, Harley Gong D.O.    labetalol (Normodyne/Trandate) injection 10 mg, 10 mg, Intravenous, Q4HRS PRN, Harley Gong D.O.    ondansetron (Zofran) syringe/vial injection 4 mg, 4 mg, Intravenous, Q4HRS PRN, Harley Gong D.O.    ondansetron (Zofran ODT) dispertab 4 mg, 4 mg, Oral, Q4HRS PRN, Harley Gong D.O.    promethazine (Phenergan) tablet 12.5-25 mg, 12.5-25 mg, Oral, Q4HRS PRN, Harley Gong D.O.    promethazine (Phenergan) suppository 12.5-25 mg, 12.5-25 mg, Rectal, Q4HRS PRN, Harley Gong D.O.    prochlorperazine (Compazine) injection 5-10 mg, 5-10 mg, Intravenous, Q4HRS PRN, Harley Gong D.O.    heparin infusion 25,000 units in 500 mL 0.45% NACL, 0-30 Units/kg/hr (Adjusted), Intravenous, Continuous, Harley Gong D.O., Paused at 02/06/24 1321    lisinopril (Prinivil) tablet 10 mg, 10 mg, Oral, Q DAY, Harley Gong D.O., 10 mg at 02/06/24 1346    hydroCHLOROthiazide tablet 12.5 mg, 12.5 mg, Oral, Q DAY,  Harley Gong D.O., 12.5 mg at 02/06/24 1346    latanoprost (Xalatan) 0.005 % ophthalmic solution 1 Drop, 1 Drop, Both Eyes, Q EVENING, Harley Gong D.O.  Medications Prior to Admission   Medication Sig Dispense Refill Last Dose    oxyCODONE-acetaminophen (PERCOCET-10)  MG Tab Take 0.5-1 Tablet by mouth every four hours as needed for Moderate Pain for up to 5 days. 30 Tablet 0     meloxicam (MOBIC) 7.5 MG Tab Take 1 Tablet by mouth every day for 30 days. 30 Tablet 0     lisinopril-hydrochlorothiazide (PRINZIDE) 10-12.5 MG per tablet Take 1 Tablet by mouth every day.       travoprost (TRAVATAN Z) 0.004 % Solution Administer 1 Drop into both eyes every day.         Social History     Socioeconomic History    Marital status:      Spouse name: Not on file    Number of children: Not on file    Years of education: Not on file    Highest education level: Not on file   Occupational History    Not on file   Tobacco Use    Smoking status: Never     Passive exposure: Past    Smokeless tobacco: Never   Vaping Use    Vaping Use: Never used   Substance and Sexual Activity    Alcohol use: Yes     Comment: 2-3 BEERS A MONTH    Drug use: Never    Sexual activity: Not on file   Other Topics Concern    Not on file   Social History Narrative    Not on file     Social Determinants of Health     Financial Resource Strain: Not on file   Food Insecurity: Not on file   Transportation Needs: Not on file   Physical Activity: Not on file   Stress: Not on file   Social Connections: Not on file   Intimate Partner Violence: Not on file   Housing Stability: Not on file     History reviewed. No pertinent family history.       Studies  Lab Results   Component Value Date/Time    CHOLSTRLTOT 154 12/09/2023 10:01 AM    LDL 85 12/09/2023 10:01 AM    HDL 48 12/09/2023 10:01 AM    TRIGLYCERIDE 103 12/09/2023 10:01 AM       Lab Results   Component Value Date/Time    SODIUM 138 02/06/2024 05:54 AM    POTASSIUM 4.0 02/06/2024 05:54 AM     "CHLORIDE 104 02/06/2024 05:54 AM    CO2 18 (L) 02/06/2024 05:54 AM    GLUCOSE 142 (H) 02/06/2024 05:54 AM    BUN 17 02/06/2024 05:54 AM    CREATININE 0.87 02/06/2024 05:54 AM     Lab Results   Component Value Date/Time    ALKPHOSPHAT 71 02/06/2024 05:54 AM    ASTSGOT 25 02/06/2024 05:54 AM    ALTSGPT 18 02/06/2024 05:54 AM    TBILIRUBIN 1.5 02/06/2024 05:54 AM      No results found for: \"BNPBTYPENAT\"      Medical records were reviewed for this encounter    For this encounter I directly reviewed Echo images equivocal abn bubble study      "

## 2024-02-06 NOTE — OR NURSING
Patient A+Ox4.  Denies nausea states pin tolerable to right arm.  Elevated on 2 pillows with warmer to right arm per Dr Orozco. Radial pulses +2 palp to right arm. Ulnar pulses right arm by doppler.  Hand warm and patient denies numbness or tingling.  Fingers good movement pink and warm. Heparin infusing as ordered.  Dr Gong also at bedside to assess post-op.  Plan for patient to admit.  To transfer when bed ready.  Patient to update his family

## 2024-02-06 NOTE — PROGRESS NOTES
Hematoma forming on incision. Dr. Orozco notified. Turn Heparin drip off for 2hours and hold gentle manual pressure for 5 mins.

## 2024-02-06 NOTE — H&P
Hospital Medicine History & Physical Note    Date of Service  2/6/2024    Primary Care Physician  Cassandra Mera P.A.-C.    Consultants  vascular surgery    Specialist Names: Dr Justin Orozco    Code Status  Full Code    Chief Complaint  Chief Complaint   Patient presents with    Numbness     Pt c/o right forearm numbness that began this morning when pt woke up, pt denies injury, hx of L patella surgery 3 weeks.        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  He was found to have an acute arterial occlusion.  He had left displaced patella fracture surgery 1 week prior (1/30/24) after an ice skating incident..  Per report of the wife the patient had in addition had some increasing shortness of breath and is questioning if he had an upper respiratory infection and is really feeling winded.      He was taken to OR by Dr Orozco emergently with a thrombectomy of a large clot.  Upon returning a CT scan of the chest was obtained which showed bilateral pulmonary emboli.  Patient was also found to have left leg DVTs.  High suspicion of paroxysmal thrombus.  Patient did have an echocardiogram which showed normal ejection fraction that showed concern of right to left shunt suggestive of intracardiac ASD or PFO.  There is also an intra-atrial septal aneurysm noted.  I have consulted cardiology for possible transesophageal echocardiogram.    Patient was seen in the postoperative room.  He was alert oriented had clear speech and was in no respiratory distress with supplemental oxygen via nasal cannula.    I discussed the plan of care with patient and family.  patient and vascular surgeon    Review of Systems  Review of Systems   Constitutional:  Negative for malaise/fatigue and weight loss.   HENT:  Negative for sore throat.    Respiratory:  Positive for shortness of breath. Negative for cough.    Cardiovascular:   Positive for leg swelling. Negative for chest pain and palpitations.   Gastrointestinal:  Negative for abdominal pain, blood in stool, melena and nausea.   Musculoskeletal:  Positive for myalgias (right arm).   Neurological:  Positive for sensory change (right hand) and weakness. Negative for headaches.   Psychiatric/Behavioral:  The patient is not nervous/anxious.        Past Medical History   has a past medical history of Cancer (HCC) (09/22/2022), Glaucoma, Hypertension, Melanoma of back (HCC) (09/26/2022), Pain, Sleep apnea, and Snoring.    Surgical History   has a past surgical history that includes appendectomy; wide excision, lesion, torso (Left, 9/26/2022); wound closure neuro (Left, 9/26/2022); pr open rx patella fx (Left, 1/30/2024); thrombectomy (Right, 2/6/2024); and angiogram (2/6/2024).     Family History  No family history of blood clotting disorders   Family history reviewed with patient. There is no family history that is pertinent to the chief complaint.     Social History   reports that he has never smoked. He has been exposed to tobacco smoke. He has never used smokeless tobacco. He reports current alcohol use. He reports that he does not use drugs.  and has children.    Allergies  No Known Allergies    Medications  Prior to Admission Medications   Prescriptions Last Dose Informant Patient Reported? Taking?   lisinopril-hydrochlorothiazide (PRINZIDE) 10-12.5 MG per tablet  Patient Yes No   Sig: Take 1 Tablet by mouth every day.   meloxicam (MOBIC) 7.5 MG Tab   No No   Sig: Take 1 Tablet by mouth every day for 30 days.   oxyCODONE-acetaminophen (PERCOCET-10)  MG Tab   No No   Sig: Take 0.5-1 Tablet by mouth every four hours as needed for Moderate Pain for up to 5 days.   travoprost (TRAVATAN Z) 0.004 % Solution  Patient Yes No   Sig: Administer 1 Drop into both eyes every day.      Facility-Administered Medications: None       Physical Exam  Temp:  [36 °C (96.8 °F)-36.6 °C (97.8 °F)]  36.6 °C (97.8 °F)  Pulse:  [62-80] 64  Resp:  [15-20] 18  BP: (124-178)/() 124/84  SpO2:  [91 %-98 %] 98 %  Blood Pressure: (!) 172/112 (anesthesiologist and surgeon aware)   Temperature: 36.1 °C (97 °F)   Pulse: 75   Respiration: 18   Pulse Oximetry: 93 %       Physical Exam  Vitals reviewed.   Constitutional:       Appearance: Normal appearance. He is not diaphoretic.   HENT:      Head: Normocephalic and atraumatic.      Nose: Nose normal.      Mouth/Throat:      Mouth: Mucous membranes are moist.      Pharynx: No oropharyngeal exudate.   Eyes:      General: No scleral icterus.        Right eye: No discharge.         Left eye: No discharge.      Extraocular Movements: Extraocular movements intact.      Conjunctiva/sclera: Conjunctivae normal.   Cardiovascular:      Rate and Rhythm: Regular rhythm. Tachycardia present.      Pulses:           Radial pulses are 2+ on the right side and 2+ on the left side.        Dorsalis pedis pulses are 2+ on the right side and 2+ on the left side.      Heart sounds: No murmur heard.  Pulmonary:      Effort: Pulmonary effort is normal. No respiratory distress.      Breath sounds: Normal breath sounds. No wheezing or rales.   Abdominal:      General: Bowel sounds are normal. There is no distension.      Palpations: Abdomen is soft.   Musculoskeletal:         General: Swelling (left leg) present. No tenderness.      Cervical back: No muscular tenderness.      Right lower leg: No edema.      Left lower leg: Edema present.   Lymphadenopathy:      Cervical: No cervical adenopathy.   Skin:     Coloration: Skin is not jaundiced or pale.   Neurological:      General: No focal deficit present.      Mental Status: He is alert and oriented to person, place, and time. Mental status is at baseline.      Cranial Nerves: No cranial nerve deficit.   Psychiatric:         Mood and Affect: Mood normal.         Behavior: Behavior normal.         Laboratory:  Recent Labs     02/06/24  0554   WBC  "15.3*   RBC 4.71   HEMOGLOBIN 15.0   HEMATOCRIT 41.6*   MCV 88.3   MCH 31.8   MCHC 36.1   RDW 38.3   PLATELETCT 265   MPV 9.4     Recent Labs     02/06/24  0554   SODIUM 138   POTASSIUM 4.0   CHLORIDE 104   CO2 18*   GLUCOSE 142*   BUN 17   CREATININE 0.87   CALCIUM 9.3     Recent Labs     02/06/24  0554   ALTSGPT 18   ASTSGOT 25   ALKPHOSPHAT 71   TBILIRUBIN 1.5   GLUCOSE 142*     Recent Labs     02/06/24  0554 02/06/24  1017   APTT 28.5 237.2*   INR 1.03 1.16*     No results for input(s): \"NTPROBNP\" in the last 72 hours.      No results for input(s): \"TROPONINT\" in the last 72 hours.    Imaging:  EC-ECHOCARDIOGRAM COMPLETE W/ CONT   Final Result      CT-CTA CHEST PULMONARY ARTERY W/ RECONS   Final Result      Acute right main and bilateral segmental and subsegmental pulmonary emboli with CT evidence of right heart strain.      Findings were conveyed to Dr. NICOLE MACHUCA on 2/6/2024 11:15 AM.         US-EXTREMITY VENOUS LOWER BILAT   Final Result      DX-PORTABLE FLUORO > 1 HOUR   Final Result      Portable fluoroscopy utilized for 1 minute 53 seconds.         INTERPRETING LOCATION: [address] [facility], [facility], [address]      DX-OPERATIVE ANGIOGRAM EACH PROJ   Final Result      Digitized intraoperative radiograph is submitted for review. This examination is not for diagnostic purpose but for guidance during a surgical procedure. Please see the patient's chart for full procedural details.         INTERPRETING LOCATION: [address] , [place], [address]      US-EXTREMITY ARTERY UPPER UNILAT RIGHT   Final Result      EC-DOMINIC W/O CONT    (Results Pending)         Assessment/Plan:  Justification for Admission Status  I anticipate this patient will require at least two midnights for appropriate medical management, necessitating inpatient admission because need of anticoagulation and monitor post OR thrombectomy for possible arrythemia and further search for etiology    Patient will need a Med/Surg bed on MEDICAL service .  The need is " secondary to possible arrhythmia as etiology.    Bilateral pulmonary embolism (HCC)  Assessment & Plan  Echocardiogram shows possible ASD PFO with shunt on bubble study.  I have consulted cardiology for a DOMINIC for further investigation  Monitor on telemetry for possible arrhythmia  Anticoagulation with heparin for now.  Monitor respiratory status    Left leg DVT (HCC)  Assessment & Plan  due to recent surgery.  anticoagulation    Arterial thrombosis (HCC)- (present on admission)  Assessment & Plan  2/6/24 Acute right arm:   Occlusion of axillary, brachial, radial, and ulnar arteries. Acute to    subacute thrombus seen in the axilla.     I have discussed with Dr Orozco.  I have ordered an urgent Echocardiogram to r/o shunt and a stat LE DVT study  Place on tele to r/o arrhythmia as etiology    Unclear etiology. Crutches causing impingement?  He will need future hypercoaguable work up.  He went straight to the OR with Dr Orozco for thrombectomy  Heparin drip for now.  Pain control.    Sleep apnea  Assessment & Plan  Monitor for nocturnal hypoxia.  Home CPAP use if available if not I will have RT set up a CPAP for him    HTN (hypertension)  Assessment & Plan  Monitor vitals.  Continue lisinopril/HCTZ with parameters.        VTE prophylaxis: therapeutic anticoagulation with heparin.

## 2024-02-06 NOTE — ED TRIAGE NOTES
"Chief Complaint   Patient presents with    Numbness     Pt c/o right forearm numbness that began this morning when pt woke up, pt denies injury, hx of L patella surgery 3 weeks.        BIB [facility] from home for above complaint Upon arrival right arm was cold, cyanotic with sluggish cap refill and pulseless. ERP at bedside. Pt left leg in brace with dressing on left knee.     Labs drawn, EKG done, Cardiac, spO2 and BP monitor on, call light within reach.       BP (!) 178/108   Pulse 76   Temp 36.6 °C (97.8 °F) (Temporal)   Resp 16   Ht 1.854 m (6' 1\")   Wt 116 kg (255 lb)   SpO2 91%   BMI 33.64 kg/m²     "

## 2024-02-06 NOTE — ANESTHESIA TIME REPORT
Anesthesia Start and Stop Event Times       Date Time Event    2/6/2024 0641 Ready for Procedure     0654 Anesthesia Start     0836 Anesthesia Stop          Responsible Staff  02/06/24      Name Role Begin End    Clint Muñiz M.D. Anesth 0654 0762    Joss Pete M.D. Anesth 0793 0836          Overtime Reason:  overtime    Comments: Overtime for Antonino

## 2024-02-06 NOTE — ANESTHESIA PREPROCEDURE EVALUATION
Case: [id number] Date/Time: 02/06/24 0715    Procedures:       THROMBECTOMY, ARM (Right)      ANGIOGRAM    Location: [facility] OR 08 / SURGERY [facility]    Surgeons: Raphael Orozco M.D.            Relevant Problems   ANESTHESIA   (positive) Sleep apnea      CARDIAC   (positive) Arm DVT (deep venous thromboembolism), acute, right (HCC)   (positive) HTN (hypertension)       Physical Exam    Airway   Mallampati: II  TM distance: >3 FB  Neck ROM: full       Cardiovascular - normal exam  Rhythm: regular  Rate: normal  (-) murmur     Dental - normal exam           Pulmonary - normal exam  Breath sounds clear to auscultation     Abdominal    Neurological - normal exam                   Anesthesia Plan    ASA 2       Plan - general       Airway plan will be LMA          Induction: intravenous    Postoperative Plan: Postoperative administration of opioids is intended.    Pertinent diagnostic labs and testing reviewed    Informed Consent:    Anesthetic plan and risks discussed with patient.

## 2024-02-06 NOTE — OR NURSING
Patient A+Ox4. VSS. Resp spont and reg. Denies SOB or chest pain.  Right arm pain tolerable and patient declines meds.dressing clean and dry to right arm.  Patient also has leg leg immobilizer in place from previous surgery to left patella last week.  Dressing to left knee intact. No drainage.  Skin bruising in left calf area and edema.  Left foot PPP.  Foot pink and warm no numbness or tingling.  Patient states he has been using walker at home with touch down weight bearing. Patient states hes has been able to amb well without assist with walker.  Plan to transfer to [facility].  Wife updated with message.

## 2024-02-06 NOTE — OR NURSING
CTA done.  Dr Gong at bedside to discuss POC.  Patient denies pain no SOB. VSS.  Cont to monitor.  Left message for wife as to patient room assignment and plan

## 2024-02-06 NOTE — ANESTHESIA POSTPROCEDURE EVALUATION
Patient: Jayjay Anders    Procedure Summary       Date: 02/06/24 Room / Location: [place] [address] / SURGERY [facility]    Anesthesia Start: 0654 Anesthesia Stop: 0836    Procedures:       THROMBECTOMY, ARM (Right: Arm Upper)      ANGIOGRAM (Arm Upper) Diagnosis: (Rightg Arm Ischemia)    Surgeons: Raphael Orozco M.D. Responsible Provider: Jsos Pete M.D.    Anesthesia Type: general ASA Status: 2            Final Anesthesia Type: general  Last vitals  BP   Blood Pressure: 136/79    Temp   36.3 °C (97.4 °F)    Pulse   67   Resp   20    SpO2   98 %      Anesthesia Post Evaluation    Patient location during evaluation: PACU  Patient participation: complete - patient participated  Level of consciousness: awake and alert    Airway patency: patent  Anesthetic complications: no  Cardiovascular status: hemodynamically stable  Respiratory status: acceptable  Hydration status: euvolemic    PONV: none          No notable events documented.     Nurse Pain Score: 2 (NPRS)

## 2024-02-06 NOTE — ANESTHESIA PROCEDURE NOTES
Airway    Date/Time: 2/6/2024 7:03 AM    Performed by: Clint Muñiz M.D.  Authorized by: Clint Muñiz M.D.    Location:  OR  Urgency:  Elective  Indications for Airway Management:  Anesthesia      Spontaneous Ventilation: absent    Sedation Level:  Deep  Preoxygenated: Yes    Mask Difficulty Assessment:  0 - not attempted  Final Airway Type:  Supraglottic airway  Final Supraglottic Airway:  Standard LMA    SGA Size:  5  Number of Attempts at Approach:  1

## 2024-02-06 NOTE — OR SURGEON
Immediate Post OP Note    PreOp Diagnosis: Right arm ischemia secondary to thromboembolism.      PostOp Diagnosis: Same.      Procedure(s):  Right arm arterial thrombectomy- Wound Class: Clean  Right arm angiogram - Wound Class: Clean    Surgeon(s):  Raphael Orozco M.D.    Anesthesiologist/Type of Anesthesia:  Anesthesiologist: Clint Muñiz M.D.; Joss Pete M.D./General    Surgical Staff:  Circulator: Dave Mcdonoguh RYAZ; Nay Rogers RYAZ; Syeda Vásquez R.N.; Carin Evans R.N.  Scrub Person: Carin Finn  Count Waldo: Marilee Tan R.N.  Radiology Technologist: Ingrid Villanueva    Specimens removed if any:  * No specimens in log *    Estimated Blood Loss: 30 mL.    IV fluids: 450 mL.    Contrast used: 24 mL Visipaque.    Findings: Large amount of fresh thrombus evacuated.  Palpable right distal ulnar pulses postop.  Radial arterial vasospasm.    Complications: None.    Dictated, #[id number].        2/6/2024 8:29 AM Raphael Orozco M.D.

## 2024-02-06 NOTE — ED PROVIDER NOTES
ED Provider Note    Scribed for Jeannette Weaver M.D. by Noel Carrillo. 2/6/2024, 5:54 AM.    Primary care provider: Cassandra Mera P.A.-C.  Means of arrival: private vehicle  History obtained from: Patient  History limited by: none    CHIEF COMPLAINT  Chief Complaint   Patient presents with    Numbness     Pt c/o right forearm numbness that began this morning when pt woke up, pt denies injury, hx of L patella surgery 3 weeks.        HPI/ROS  Jayjay Sabino Anders is a 58 y.o. male who presents to the Emergency Department per EMS for evaluation of right arm tingling and weakness onset 4:45 AM this morning. Patient reports that last Saturday he had experienced a ground level fall while ice-skating landing on his patella.  He subsequently had this repaired on 1/30/2024 by Dr. Ledesma.  He was doing well until this morning.  This morning at around 4:45 AM when getting up to use the restroom, he felt sudden onset paresthesias from his right elbow down.  Patient adds sensations of paresthesias and aching in lower forearm.  He notes he has a history of hypertension and takes lisinopril for it. Patient denies a history of blood clots.  Patient is a surgeon, right-hand-dominant.        EXTERNAL RECORDS REVIEWED  Recent left patellar repair on 1/30/2024 by Dr. Ledesma    LIMITATION TO HISTORY   Select: : None    OUTSIDE HISTORIAN(S):  None      PAST MEDICAL HISTORY   has a past medical history of Cancer (HCC) (09/22/2022), Glaucoma, Hypertension, Melanoma of back (HCC) (09/26/2022), Pain, Sleep apnea, and Snoring.    SURGICAL HISTORY   has a past surgical history that includes appendectomy; wide excision, lesion, torso (Left, 9/26/2022); wound closure neuro (Left, 9/26/2022); and open rx patella fx (Left, 1/30/2024).    SOCIAL HISTORY  Social History     Tobacco Use    Smoking status: Never     Passive exposure: Past    Smokeless tobacco: Never   Vaping Use    Vaping Use: Never used   Substance Use Topics    Alcohol use:  "Yes     Comment: 2-3 BEERS A MONTH    Drug use: Never      Social History     Substance and Sexual Activity   Drug Use Never       FAMILY HISTORY  History reviewed. No pertinent family history.    CURRENT MEDICATIONS  Home Medications       Reviewed by Carin Evans R.N. (Registered Nurse) on 02/06/24 at 0726  Med List Status: Not Addressed     Medication Last Dose Status   lisinopril-hydrochlorothiazide (PRINZIDE) 10-12.5 MG per tablet  Active   meloxicam (MOBIC) 7.5 MG Tab  Active   oxyCODONE-acetaminophen (PERCOCET-10)  MG Tab  Active   travoprost (TRAVATAN Z) 0.004 % Solution  Active                    ALLERGIES  No Known Allergies    PHYSICAL EXAM  VITAL SIGNS: BP (!) 178/108   Pulse 76   Temp 36.6 °C (97.8 °F) (Temporal)   Resp 16   Ht 1.854 m (6' 1\")   Wt 116 kg (255 lb)   SpO2 91%   BMI 33.64 kg/m²   Vitals reviewed by myself.  Physical Exam  Nursing note and vitals reviewed.  Constitutional: Well-developed and well-nourished.  Patient appears uncomfortable  HENT: Head is normocephalic and atraumatic.  Eyes: extra-ocular movements intact  Cardiovascular: Regular rate and  regular rhythm.  No palpable distal radius or ulnar pulse on the right hand, hand is cool to the touch and borderline cyanotic.  No dopplerable right ulnar, right radial, right brachial, right axillary pulses  Pulmonary/Chest: Normal respiratory effort  Musculoskeletal: Patient has decreased  strength in the right hand, he is able to wiggle his fingers, diminished sensation in the right hand  Neurological: Awake and alert, cranial nerves II through XII intact, 5 out of 5  strength in left upper extremity, normal strength in bilateral lower extremities.  Decreased  strength in right upper extremity and decreased sensation in the right hand  Skin: Skin is warm and dry. No rash.            DIAGNOSTIC STUDIES:  LABS  Labs Reviewed   CBC WITH DIFFERENTIAL - Abnormal; Notable for the following components:       " Result Value    WBC 15.3 (*)     Hematocrit 41.6 (*)     Lymphocytes 19.90 (*)     Neutrophils (Absolute) 10.80 (*)     Monos (Absolute) 1.18 (*)     All other components within normal limits    Narrative:     Indicate which anticoagulants the patient is on:->NONE   COMP METABOLIC PANEL - Abnormal; Notable for the following components:    Co2 18 (*)     Glucose 142 (*)     All other components within normal limits    Narrative:     Indicate which anticoagulants the patient is on:->NONE   POCT GLUCOSE DEVICE RESULTS - Abnormal; Notable for the following components:    POC Glucose, Blood 146 (*)     All other components within normal limits   APTT    Narrative:     Indicate which anticoagulants the patient is on:->NONE   PROTHROMBIN TIME    Narrative:     Indicate which anticoagulants the patient is on:->NONE   HEPARIN XA (UNFRACTIONATED)   ESTIMATED GFR    Narrative:     Indicate which anticoagulants the patient is on:->NONE   APTT   PROTHROMBIN TIME   HEPARIN XA (UNFRACTIONATED)       All labs reviewed and independently interpreted by myself    EKG Interpretation:    12 Lead EKG independently interpreted by myself to show:  EKG at 6:06 AM: Normal sinus rhythm, heart rate 72, normal axis, normal intervals, , , QTc 478, nonspecific T wave inversions in anterior leads, compared to prior EKG this was present previously on V2, V3 T wave inversions are new, no acute ST elevation, no evidence of acute arrhythmia or ischemia per my independent interpretation    RADIOLOGY  Images independently interpreted by myself prior to radiologist review:  -Ultrasound reviewed at bedside while it was being performed.  Acute occlusion noted in the axillary brachial junction with no flow distal to this    Final interpretation by radiology demonstrates:    US-EXTREMITY ARTERY UPPER UNILAT RIGHT   Final Result      DX-PORTABLE FLUORO > 1 HOUR    (Results Pending)   DX-OPERATIVE ANGIOGRAM EACH PROJ    (Results Pending)    EC-ECHOCARDIOGRAM COMPLETE W/O CONT    (Results Pending)   US-EXTREMITY VENOUS LOWER BILAT    (Results Pending)     The radiologist's interpretation of all radiological studies have been reviewed by me.        COURSE & MEDICAL DECISION MAKING    ED Observation Status? No; Patient does not meet criteria for ED Observation.     INITIAL ASSESSMENT, ED COURSE AND PLAN    Patient is a 58-year-old male who presents for evaluation of right hand paresthesias.  Differential diagnosis includes arterial occlusion, DVT, central stroke.  On exam patient has a cold right upper extremity with no palpable pulses.  No other neurologic symptoms, low suspicion for central etiology of symptoms.  Exam is consistent with acute arterial occlusion.    Patient's initial vitals notable for hypertension.  I immediately ordered heparin after my assessment given concern for acute arterial occlusion and spoke with vascular surgeon Dr. Orozco.  He agrees with heparin therapy, will plan to take patient to the OR for definitive management.  I spoke with pharmacy team to ensure that heparin therapy was expedited.  Patient's pain was managed with morphine.  EKG returns and demonstrates no evidence of acute ischemia, nonspecific ST depressions are noted however patient is not having any chest pain, low suspicion for cardiac etiology.  I discussed the case again with Dr. Orozco who is planning to take the patient to the OR immediately, vascular ultrasound did confirm acute arterial occlusion.  Patient is taken to the OR in guarded condition.  Case was discussed with Dr. Da Silva who will hospitalize patient for ongoing management.    Labs are reviewed and notable for slight leukocytosis likely reactive to acute arterial occlusion.  Labs were otherwise unremarkable.       REASSESSMENTS   5:54 AM - Patient was evaluated at bedside and was discussed plan of care including imaging, labs, and EKG. Patient was     6:03 AM - Spoke with Dr. Orozco (Vascular  Surgeon), agrees with heparin drip, he is calling OR    6:10 AM-Dr. Orozco plans to take the patient to the OR now, recommends admission to medicine service    6:21 AM - Dr. Orozco (Vascular Surgeon) updated patient on ultrasound results demonstrating acute arterial occlusion    6:34 AM - Spoke with Dr. Da Silva (Hospitalist) who will hospitalize patient for ongoing management    CRITICAL CARE  The very real possibilty of a deterioration, potential for loss of limb of this patient's condition required the highest level of my preparedness for sudden, emergent intervention.  I provided critical care services, which included medication orders, frequent reevaluations of the patient's condition and response to treatment, ordering and reviewing test results, and discussing the case with various consultants.  The critical care time associated with the care of the patient was 32 minutes. Review chart for interventions. This time is exclusive of any other billable procedures.     ADDITIONAL PROBLEM LIST  Hypertension    DISPOSITION AND DISCUSSIONS  I have discussed management of the patient with the following physicians and ZARINA's:  Dr. Orozco (Vascular Surgeon) & Dr. Da Silva (Hospitalist)    Discussion of management with other [facility] or appropriate source(s): Pharmacy to initiate heparin      Escalation of care considered, and ultimately not performed:see above    Barriers to care at this time, including but not limited to:  None .     Decision tools and prescription drugs considered including, but not limited to: see above.    Patient will be admitted to Dr. Da Silva (Hospitalist) in critical condition.     Please see review of records as noted above  FINAL IMPRESSION  1. Arterial occlusion      -ADMIT-      Noel ALFONSO (Giorgio), am scribing for, and in the presence of, Jeannette Weaver M.D..    Electronically signed by: Noel Carrillo (Giorgio), 2/6/2024    Jeannette ALFONSO M.D. personally performed the services described in this  documentation, as scribed by Noel Carrillo in my presence, and it is both accurate and complete.    The note accurately reflects work and decisions made by me.  Jeannette Weaver M.D.  2/6/2024  8:44 AM

## 2024-02-06 NOTE — ASSESSMENT & PLAN NOTE
2/6/24 Acute right arm:   Occlusion of axillary, brachial, radial, and ulnar arteries. Acute to    subacute thrombus seen in the axilla.   2/6 had emergent thrombectomy by Dr LOURDES Orozco.  On heparin drip  Likely due to DVT left leg causing PE with back pressure causing PFO or ASD to shunt clot to right arm.  Convert to Eliquis once no further procedures planned.

## 2024-02-06 NOTE — OP REPORT
DATE OF SERVICE:  02/06/2024     SURGEON:  Raphael Orozco MD     ANESTHESIOLOGISTS:  Clint Muñiz MD and Joss Pete MD     TYPE OF ANESTHESIA:  General anesthesia and local anesthesia with 20 ml of 0.25% Marcaine with epinephrine solution.     PREOPERATIVE DIAGNOSIS:  Right arm ischemia secondary to arterial   thromboembolism.     POSTOPERATIVE DIAGNOSIS:  Right arm ischemia secondary to arterial   thromboembolism.     PROCEDURES:  1.  Right brachial and axillary arterial thrombectomy.  2.  Right radial, interosseous, and ulnar arterial thrombectomy.  3.  Needle, right arm.  4.  Right arm angiogram.     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  Noninvasive vascular study was performed and showed thrombus   in the axillary artery, extended down to the brachial artery.  Discussion was   made with the patient.  He would like to undergo above procedures, fully   understanding all risks.     DESCRIPTION OF PROCEDURE:  Informed consent was obtained.  The patient was   taken to the operating room and was placed in the supine position.  Sequential   compression devices were applied.  The patient was given Ancef intravenously.    General anesthesia was induced.     Next, his right upper extremity was sterilely prepped and draped in the normal   fashion.  A timeout procedure was done.  An incision was made in the proximal   forearm, centering over the distal brachial artery.  The incision was   extended through subcutaneous tissue using the electrocautery.  The brachial   artery, radial artery, interosseous artery and ulnar artery were identified and   carefully dissected free from the surrounding tissues.     The patient was given heparin 6000 units intravenously.  After the heparin was   allowed to circulate systemically for 3 minutes, a transverse arteriotomy was   made in the distal  brachial artery.  A #3 Jose Guadalupe thromboembolectomy catheter   was passed into the axillary artery, inflated and retracted.  Large amount of   fresh thrombus was evacuated.  Excellent inflow was seen.  The catheter was   passed 1 more time and no further thrombus was evacuated.  Vascular   control of the artery was obtained with vascular clamps.     Next, thrombectomy of the radial, interosseous and ulnar arteries was then   performed using #2 Jose Guadalupe embolectomy catheter.  There was small amount of   thrombus evacuated.  Good backbleeding was seen.     The arteriotomy was closed with interrupted 6-0 Prolene sutures.  Prior to   completing the closing, backbleeding and flushing were obtained.  The repair   was completed.  Flow was restored.     Next, the brachial artery was cannulated using a micropuncture kit.  Right arm   angiogram was obtained through the microcatheter.  There was flow from the   brachial artery into the ulnar, radial and interosseous vessels.  There was   vasospasm seen in the brachial artery as well as in the radial and in the   interosseous vessels.  The patient was given 3 mg of papaverine as well as the   papaverine was applied locally.  The angiogram was repeated and showed   improvement in vasospasm.     The microcatheter was removed and the puncture site was closed with a 7-0   Prolene suture.     The wound was irrigated and was found to have excellent hemostasis.  The wound   was anesthetized with 20 mL of 0.25% Marcaine with epinephrine solution.  The   wound was closed with 3-0 nylon vertical mattresses.  The wound was cleaned   and a sterile dressing was applied.     ESTIMATED BLOOD LOSS:  30 mL.     INTRAVENOUS FLUIDS:  450 mL.     CONTRAST USED:  24 mL Visipaque.     COUNTS:  Sponge and instrument count was correct x2.     The patient was then awakened, extubated, and taken to recovery room in stable   condition with palpable distal radial and ulnar pulses with multiphasic flow and  normal perfusion and intact sensory motor function of the right hand.        ______________________________  MD SAAD Anthony/SHERIDAN    DD:  02/06/2024 08:37  DT:  02/06/2024 09:06    Job#:  [id number]

## 2024-02-07 ENCOUNTER — APPOINTMENT (OUTPATIENT)
Dept: CARDIOLOGY | Facility: MEDICAL CENTER | Age: 59
DRG: 252 | End: 2024-02-07
Attending: NURSE PRACTITIONER
Payer: COMMERCIAL

## 2024-02-07 ENCOUNTER — ANESTHESIA EVENT (OUTPATIENT)
Dept: CARDIOLOGY | Facility: MEDICAL CENTER | Age: 59
DRG: 252 | End: 2024-02-07
Payer: COMMERCIAL

## 2024-02-07 ENCOUNTER — ANESTHESIA (OUTPATIENT)
Dept: CARDIOLOGY | Facility: MEDICAL CENTER | Age: 59
DRG: 252 | End: 2024-02-07
Payer: COMMERCIAL

## 2024-02-07 VITALS
HEART RATE: 71 BPM | SYSTOLIC BLOOD PRESSURE: 124 MMHG | TEMPERATURE: 98.1 F | RESPIRATION RATE: 18 BRPM | WEIGHT: 275.57 LBS | BODY MASS INDEX: 36.52 KG/M2 | OXYGEN SATURATION: 96 % | HEIGHT: 73 IN | DIASTOLIC BLOOD PRESSURE: 69 MMHG

## 2024-02-07 PROBLEM — I82.621 ARM DVT (DEEP VENOUS THROMBOEMBOLISM), ACUTE, RIGHT (HCC): Status: RESOLVED | Noted: 2024-02-06 | Resolved: 2024-02-07

## 2024-02-07 PROBLEM — S82.002D CLOSED DISPLACED FRACTURE OF LEFT PATELLA WITH ROUTINE HEALING: Status: ACTIVE | Noted: 2024-02-07

## 2024-02-07 PROBLEM — R73.9 HYPERGLYCEMIA: Status: ACTIVE | Noted: 2024-02-07

## 2024-02-07 PROBLEM — I74.9 ARTERIAL THROMBOSIS (HCC): Status: RESOLVED | Noted: 2024-02-06 | Resolved: 2024-02-07

## 2024-02-07 LAB
ANION GAP SERPL CALC-SCNC: 9 MMOL/L (ref 7–16)
APTT PPP: 59.6 SEC (ref 24.7–36)
BUN SERPL-MCNC: 23 MG/DL (ref 8–22)
CALCIUM SERPL-MCNC: 8.6 MG/DL (ref 8.5–10.5)
CHLORIDE SERPL-SCNC: 102 MMOL/L (ref 96–112)
CO2 SERPL-SCNC: 25 MMOL/L (ref 20–33)
CREAT SERPL-MCNC: 0.87 MG/DL (ref 0.5–1.4)
ERYTHROCYTE [DISTWIDTH] IN BLOOD BY AUTOMATED COUNT: 40.3 FL (ref 35.9–50)
GFR SERPLBLD CREATININE-BSD FMLA CKD-EPI: 99 ML/MIN/1.73 M 2
GLUCOSE SERPL-MCNC: 141 MG/DL (ref 65–99)
HCT VFR BLD AUTO: 36.4 % (ref 42–52)
HGB BLD-MCNC: 12.5 G/DL (ref 14–18)
INR PPP: 1.03 (ref 0.87–1.13)
MCH RBC QN AUTO: 31.7 PG (ref 27–33)
MCHC RBC AUTO-ENTMCNC: 34.3 G/DL (ref 32.3–36.5)
MCV RBC AUTO: 92.4 FL (ref 81.4–97.8)
PLATELET # BLD AUTO: 239 K/UL (ref 164–446)
PMV BLD AUTO: 9.5 FL (ref 9–12.9)
POTASSIUM SERPL-SCNC: 4.2 MMOL/L (ref 3.6–5.5)
PROTHROMBIN TIME: 13.6 SEC (ref 12–14.6)
RBC # BLD AUTO: 3.94 M/UL (ref 4.7–6.1)
SODIUM SERPL-SCNC: 136 MMOL/L (ref 135–145)
UFH PPP CHRO-ACNC: 0.25 IU/ML
UFH PPP CHRO-ACNC: 0.3 IU/ML
WBC # BLD AUTO: 14.4 K/UL (ref 4.8–10.8)

## 2024-02-07 PROCEDURE — 97535 SELF CARE MNGMENT TRAINING: CPT

## 2024-02-07 PROCEDURE — 97162 PT EVAL MOD COMPLEX 30 MIN: CPT

## 2024-02-07 PROCEDURE — 160035 HCHG PACU - 1ST 60 MINS PHASE I

## 2024-02-07 PROCEDURE — 99239 HOSP IP/OBS DSCHRG MGMT >30: CPT | Performed by: HOSPITALIST

## 2024-02-07 PROCEDURE — 700111 HCHG RX REV CODE 636 W/ 250 OVERRIDE (IP): Mod: JZ | Performed by: STUDENT IN AN ORGANIZED HEALTH CARE EDUCATION/TRAINING PROGRAM

## 2024-02-07 PROCEDURE — 97166 OT EVAL MOD COMPLEX 45 MIN: CPT

## 2024-02-07 PROCEDURE — 97530 THERAPEUTIC ACTIVITIES: CPT

## 2024-02-07 PROCEDURE — 700101 HCHG RX REV CODE 250: Performed by: STUDENT IN AN ORGANIZED HEALTH CARE EDUCATION/TRAINING PROGRAM

## 2024-02-07 PROCEDURE — 85027 COMPLETE CBC AUTOMATED: CPT

## 2024-02-07 PROCEDURE — 700102 HCHG RX REV CODE 250 W/ 637 OVERRIDE(OP): Performed by: HOSPITALIST

## 2024-02-07 PROCEDURE — 85610 PROTHROMBIN TIME: CPT

## 2024-02-07 PROCEDURE — 85730 THROMBOPLASTIN TIME PARTIAL: CPT

## 2024-02-07 PROCEDURE — 700105 HCHG RX REV CODE 258: Performed by: STUDENT IN AN ORGANIZED HEALTH CARE EDUCATION/TRAINING PROGRAM

## 2024-02-07 PROCEDURE — 93312 ECHO TRANSESOPHAGEAL: CPT | Mod: 26 | Performed by: INTERNAL MEDICINE

## 2024-02-07 PROCEDURE — 160002 HCHG RECOVERY MINUTES (STAT)

## 2024-02-07 PROCEDURE — 700111 HCHG RX REV CODE 636 W/ 250 OVERRIDE (IP): Performed by: HOSPITALIST

## 2024-02-07 PROCEDURE — 93325 DOPPLER ECHO COLOR FLOW MAPG: CPT

## 2024-02-07 PROCEDURE — 80048 BASIC METABOLIC PNL TOTAL CA: CPT

## 2024-02-07 PROCEDURE — A9270 NON-COVERED ITEM OR SERVICE: HCPCS | Performed by: HOSPITALIST

## 2024-02-07 PROCEDURE — 85520 HEPARIN ASSAY: CPT | Mod: 91

## 2024-02-07 PROCEDURE — 36415 COLL VENOUS BLD VENIPUNCTURE: CPT

## 2024-02-07 RX ORDER — HALOPERIDOL 5 MG/ML
1 INJECTION INTRAMUSCULAR
Status: DISCONTINUED | OUTPATIENT
Start: 2024-02-07 | End: 2024-02-07 | Stop reason: HOSPADM

## 2024-02-07 RX ORDER — GLYCOPYRROLATE 0.2 MG/ML
INJECTION INTRAMUSCULAR; INTRAVENOUS PRN
Status: DISCONTINUED | OUTPATIENT
Start: 2024-02-07 | End: 2024-02-07 | Stop reason: SURG

## 2024-02-07 RX ORDER — SODIUM CHLORIDE, SODIUM LACTATE, POTASSIUM CHLORIDE, CALCIUM CHLORIDE 600; 310; 30; 20 MG/100ML; MG/100ML; MG/100ML; MG/100ML
INJECTION, SOLUTION INTRAVENOUS
Status: DISCONTINUED | OUTPATIENT
Start: 2024-02-07 | End: 2024-02-07 | Stop reason: SURG

## 2024-02-07 RX ORDER — LIDOCAINE HYDROCHLORIDE 40 MG/ML
SOLUTION TOPICAL PRN
Status: DISCONTINUED | OUTPATIENT
Start: 2024-02-07 | End: 2024-02-07 | Stop reason: SURG

## 2024-02-07 RX ORDER — HEPARIN SODIUM 5000 [USP'U]/100ML
0-30 INJECTION, SOLUTION INTRAVENOUS CONTINUOUS
Status: ACTIVE | OUTPATIENT
Start: 2024-02-07 | End: 2024-02-07

## 2024-02-07 RX ORDER — ONDANSETRON 2 MG/ML
4 INJECTION INTRAMUSCULAR; INTRAVENOUS
Status: DISCONTINUED | OUTPATIENT
Start: 2024-02-07 | End: 2024-02-07 | Stop reason: HOSPADM

## 2024-02-07 RX ORDER — LIDOCAINE HYDROCHLORIDE 20 MG/ML
INJECTION, SOLUTION EPIDURAL; INFILTRATION; INTRACAUDAL; PERINEURAL PRN
Status: DISCONTINUED | OUTPATIENT
Start: 2024-02-07 | End: 2024-02-07 | Stop reason: SURG

## 2024-02-07 RX ORDER — DIPHENHYDRAMINE HYDROCHLORIDE 50 MG/ML
12.5 INJECTION INTRAMUSCULAR; INTRAVENOUS
Status: DISCONTINUED | OUTPATIENT
Start: 2024-02-07 | End: 2024-02-07 | Stop reason: HOSPADM

## 2024-02-07 RX ADMIN — PROPOFOL 40 MG: 10 INJECTION, EMULSION INTRAVENOUS at 09:29

## 2024-02-07 RX ADMIN — PROPOFOL 20 MG: 10 INJECTION, EMULSION INTRAVENOUS at 09:37

## 2024-02-07 RX ADMIN — HYDROCHLOROTHIAZIDE 12.5 MG: 25 TABLET ORAL at 05:53

## 2024-02-07 RX ADMIN — PROPOFOL 30 MG: 10 INJECTION, EMULSION INTRAVENOUS at 09:34

## 2024-02-07 RX ADMIN — GLYCOPYRROLATE 0.2 MG: 0.2 INJECTION INTRAMUSCULAR; INTRAVENOUS at 09:31

## 2024-02-07 RX ADMIN — APIXABAN 10 MG: 5 TABLET, FILM COATED ORAL at 16:02

## 2024-02-07 RX ADMIN — FENTANYL CITRATE 100 MCG: 50 INJECTION, SOLUTION INTRAMUSCULAR; INTRAVENOUS at 09:22

## 2024-02-07 RX ADMIN — LIDOCAINE HYDROCHLORIDE 4 ML: 40 SOLUTION TOPICAL at 09:24

## 2024-02-07 RX ADMIN — HEPARIN SODIUM 17 UNITS/KG/HR: 5000 INJECTION, SOLUTION INTRAVENOUS at 02:38

## 2024-02-07 RX ADMIN — LISINOPRIL 10 MG: 10 TABLET ORAL at 05:53

## 2024-02-07 RX ADMIN — LIDOCAINE HYDROCHLORIDE 100 MG: 20 INJECTION, SOLUTION EPIDURAL; INFILTRATION; INTRACAUDAL at 09:27

## 2024-02-07 RX ADMIN — PROPOFOL 60 MG: 10 INJECTION, EMULSION INTRAVENOUS at 09:27

## 2024-02-07 RX ADMIN — SODIUM CHLORIDE, POTASSIUM CHLORIDE, SODIUM LACTATE AND CALCIUM CHLORIDE: 600; 310; 30; 20 INJECTION, SOLUTION INTRAVENOUS at 09:18

## 2024-02-07 ASSESSMENT — GAIT ASSESSMENTS
GAIT LEVEL OF ASSIST: SUPERVISED
DISTANCE (FEET): 40
DEVIATION: ANTALGIC;STEP TO
ASSISTIVE DEVICE: PLATFORM WALKER

## 2024-02-07 ASSESSMENT — COGNITIVE AND FUNCTIONAL STATUS - GENERAL
SUGGESTED CMS G CODE MODIFIER MOBILITY: CK
MOBILITY SCORE: 18
DRESSING REGULAR LOWER BODY CLOTHING: A LOT
DAILY ACTIVITIY SCORE: 20
TOILETING: A LITTLE
HELP NEEDED FOR BATHING: A LITTLE
MOVING TO AND FROM BED TO CHAIR: A LITTLE
MOVING FROM LYING ON BACK TO SITTING ON SIDE OF FLAT BED: A LITTLE
STANDING UP FROM CHAIR USING ARMS: A LITTLE
TURNING FROM BACK TO SIDE WHILE IN FLAT BAD: A LITTLE
WALKING IN HOSPITAL ROOM: A LITTLE
SUGGESTED CMS G CODE MODIFIER DAILY ACTIVITY: CJ
CLIMB 3 TO 5 STEPS WITH RAILING: A LITTLE

## 2024-02-07 ASSESSMENT — FIBROSIS 4 INDEX: FIB4 SCORE: 1.31

## 2024-02-07 ASSESSMENT — ENCOUNTER SYMPTOMS
HEADACHES: 0
NERVOUS/ANXIOUS: 0
SHORTNESS OF BREATH: 0

## 2024-02-07 ASSESSMENT — ACTIVITIES OF DAILY LIVING (ADL): TOILETING: INDEPENDENT

## 2024-02-07 ASSESSMENT — PAIN SCALES - GENERAL: PAIN_LEVEL: 0

## 2024-02-07 ASSESSMENT — PAIN DESCRIPTION - PAIN TYPE: TYPE: ACUTE PAIN

## 2024-02-07 NOTE — CARE PLAN
The patient is Stable - Low risk of patient condition declining or worsening    Shift Goals  Clinical Goals: Promote sleep, pain control, npo midnight    Progress made toward(s) clinical / shift goals:    Problem: Knowledge Deficit - Standard  Goal: Patient and family/care givers will demonstrate understanding of plan of care, disease process/condition, diagnostic tests and medications  Outcome: Progressing     Problem: Pain - Standard  Goal: Alleviation of pain or a reduction in pain to the patient’s comfort goal  Outcome: Progressing     Problem: Fall Risk  Goal: Patient will remain free from falls  Outcome: Progressing     Problem: Skin Integrity  Goal: Skin integrity is maintained or improved  Outcome: Progressing       Patient is not progressing towards the following goals:

## 2024-02-07 NOTE — FACE TO FACE
Face to Face Note  -  Durable Medical Equipment    Harley Gong D.O. - NPI: [id number]  I certify that this patient is under my care and that they had a durable medical equipment(DME)face to face encounter by myself that meets the physician DME face-to-face encounter requirements with this patient on:    Date of encounter:   Patient:                    MRN:                       YOB: 2025  Jayjay Anders  [id number]  1965     The encounter with the patient was in whole, or in part, for the following medical condition, which is the primary reason for durable medical equipment:  Other - right arm thrombosis with recent surgery and left leg patella fracture s/p surgery    I certify that, based on my findings, the following durable medical equipment is medically necessary:    Walkers and Other DME Equipment - walker with right arm brace .    My Clinical findings support the need for the above equipment due to:  Other - no weight bearing to right arm x 7 days due to arterial thrombectomy and needs walker for recent left patella surgical repair

## 2024-02-07 NOTE — ASSESSMENT & PLAN NOTE
S/p surgical repair by Dr Ledesma.  2/7 I have ordered PT/OT  He has been using a walker at home.  He has stairs at his home.

## 2024-02-07 NOTE — DISCHARGE PLANNING
Case Management Discharge Planning    Admission Date: 2/6/2024  GMLOS: 5.4  ALOS: 1    6-Clicks ADL Score:    6-Clicks Mobility Score:        Anticipated Discharge Dispo: Discharge Disposition: Discharged to home/self care (01)    DME Needed: Yes    DME Ordered: Yes    Action(s) Taken: Discussed pt with Dr. Gong. DME ordered for walker with right arm platform. CM RN collected choice for [employer]. CM RN called walker with right platform is in stock. CM RN sent order to traction to bring walker with platform to pt.     Escalations Completed: None    Medically Clear: Yes

## 2024-02-07 NOTE — ASSESSMENT & PLAN NOTE
Follow up with primary care  Needs ongoing activity and lifestyle modification for weight loss  I like his idea of future cycling! Fully support this once he is off anticoagulation.  Perhabs a stationary bike in the mean time.

## 2024-02-07 NOTE — DISCHARGE INSTRUCTIONS
Discharge Instructions per Harley Gong D.O.      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    Return to ER if needed

## 2024-02-07 NOTE — ASSESSMENT & PLAN NOTE
due to recent surgery.  Anticoagulation with heparin for now.  Switch to Eliquis once no further procedures planned.

## 2024-02-07 NOTE — PROGRESS NOTES
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  He was found to have an acute arterial occlusion.  He had left displaced patella fracture surgery 1 week prior (1/30/24) after an ice skating incident..  Per report of the wife the patient had in addition had some increasing shortness of breath and is questioning if he had an upper respiratory infection and is really feeling winded.       He was taken to OR by Dr Orozco emergently with a thrombectomy of a large clot.  Upon returning a CT scan of the chest was obtained which showed bilateral pulmonary emboli.  Patient was also found to have left leg DVTs.  High suspicion of paroxysmal thrombus.  Patient did have an echocardiogram which showed normal ejection fraction that showed concern of right to left shunt suggestive of intracardiac ASD or PFO.  There is also an intra-atrial septal aneurysm noted.  I have consulted cardiology for possible transesophageal echocardiogram.    Interval Problem Update  2/7/24: DOMINIC planned.  Per RN yesterday he developed a hematoma of right arm and had ACE wrap placed over area.  He remains with outstanding radial artery pulse of the right arm and no ischemic concerns.  He is alert and on 2L NC with SpO2:100%.  I have discussed with floor RN to assess his O2 needs.  We will attempt ambulation.  I did discuss need to convert him from heparin to Eliquis and no NSAIDs, no contact activities.      I have discussed this patient's plan of care and discharge plan at IDT rounds today with Case Management, Nursing, Nursing leadership, and other members of the IDT team.    Consultants/Specialty  cardiology and vascular surgery    Code Status  Full Code    Disposition  The patient is not medically cleared for discharge to home or a post-acute  facility.  Anticipate discharge to: home with close outpatient follow-up    I have placed the appropriate orders for post-discharge needs.    Review of Systems  Review of Systems   Constitutional:  Negative for malaise/fatigue.   Respiratory:  Negative for shortness of breath.    Cardiovascular:  Negative for leg swelling.   Neurological:  Negative for headaches.   Psychiatric/Behavioral:  The patient is not nervous/anxious.         Physical Exam  Temp:  [36 °C (96.8 °F)-36.8 °C (98.2 °F)] 36.5 °C (97.7 °F)  Pulse:  [62-80] 65  Resp:  [15-20] 16  BP: (111-148)/(63-89) 126/71  SpO2:  [94 %-99 %] 99 %    Physical Exam  Vitals reviewed.   Constitutional:       Appearance: Normal appearance.   HENT:      Head: Normocephalic.      Nose: Nose normal.   Eyes:      Conjunctiva/sclera: Conjunctivae normal.   Cardiovascular:      Rate and Rhythm: Normal rate and regular rhythm.      Pulses: Normal pulses.   Pulmonary:      Effort: Pulmonary effort is normal. No respiratory distress.   Abdominal:      General: There is no distension.      Palpations: Abdomen is soft.      Tenderness: There is no abdominal tenderness.   Musculoskeletal:      Cervical back: Normal range of motion.      Right lower leg: No edema.      Left lower leg: No edema.   Skin:     Comments: Left knee surgical area with intact staples, good approximation of surgical edges, no erythema nor swelling.   Neurological:      General: No focal deficit present.      Mental Status: He is alert and oriented to person, place, and time.      Cranial Nerves: No cranial nerve deficit.   Psychiatric:         Mood and Affect: Mood normal.         Behavior: Behavior normal.         Thought Content: Thought content normal.         Fluids    Intake/Output Summary (Last 24 hours) at 2/7/2024 0745  Last data filed at 2/7/2024 0402  Gross per 24 hour   Intake 450 ml   Output 1175 ml   Net -725 ml       Laboratory  Recent Labs     02/06/24  0554 02/07/24  0553   WBC 15.3* 14.4*    RBC 4.71 3.94*   HEMOGLOBIN 15.0 12.5*   HEMATOCRIT 41.6* 36.4*   MCV 88.3 92.4   MCH 31.8 31.7   MCHC 36.1 34.3   RDW 38.3 40.3   PLATELETCT 265 239   MPV 9.4 9.5     Recent Labs     02/06/24  0554 02/07/24  0553   SODIUM 138 136   POTASSIUM 4.0 4.2   CHLORIDE 104 102   CO2 18* 25   GLUCOSE 142* 141*   BUN 17 23*   CREATININE 0.87 0.87   CALCIUM 9.3 8.6     Recent Labs     02/06/24  0554 02/06/24  1017   APTT 28.5 237.2*   INR 1.03 1.16*               Imaging  EC-ECHOCARDIOGRAM COMPLETE W/ CONT   Final Result      CT-CTA CHEST PULMONARY ARTERY W/ RECONS   Final Result      Acute right main and bilateral segmental and subsegmental pulmonary emboli with CT evidence of right heart strain.      Findings were conveyed to Dr. NICOLE MACHUCA on 2/6/2024 11:15 AM.         US-EXTREMITY VENOUS LOWER BILAT   Final Result      DX-PORTABLE FLUORO > 1 HOUR   Final Result      Portable fluoroscopy utilized for 1 minute 53 seconds.         INTERPRETING LOCATION: [facility], [address]      DX-OPERATIVE ANGIOGRAM EACH PROJ   Final Result      Digitized intraoperative radiograph is submitted for review. This examination is not for diagnostic purpose but for guidance during a surgical procedure. Please see the patient's chart for full procedural details.         INTERPRETING LOCATION: [address] [facility], [address]      US-EXTREMITY ARTERY UPPER UNILAT RIGHT   Final Result      EC-DOMINIC W/O CONT    (Results Pending)        Assessment/Plan  Hyperglycemia  Assessment & Plan  Follow up with primary care  Needs ongoing activity and lifestyle modification for weight loss  I like his idea of future cycling! Fully support this once he is off anticoagulation.  Perhabs a stationary bike in the mean time.    Closed displaced fracture of left patella with routine healing  Assessment & Plan  S/p surgical repair by Dr Ledesma.  2/7 I have ordered PT/OT  He has been using a walker at home.  He has stairs at his home.    Interatrial cardiac  shunt  Assessment & Plan  I consulted cardiology 2/6  Dr Aguilar and Dr Evans assessing for possible PFO/ASD closure.  2/7 DOMINIC planned    Bilateral pulmonary embolism (HCC)  Assessment & Plan  Echocardiogram shows possible ASD PFO with shunt on bubble study.  DOMINIC for further investigation  Cardiology consulting  Monitor on telemetry for possible arrhythmia  Anticoagulation with heparin for now.  Monitor respiratory status    Left leg DVT (HCC)  Assessment & Plan  due to recent surgery.  Anticoagulation with heparin for now.  Switch to Eliquis once no further procedures planned.      Arterial thrombosis (HCC)- (present on admission)  Assessment & Plan  2/6/24 Acute right arm:   Occlusion of axillary, brachial, radial, and ulnar arteries. Acute to    subacute thrombus seen in the axilla.   2/6 had emergent thrombectomy by Dr LOURDES Orozco.  On heparin drip  Likely due to DVT left leg causing PE with back pressure causing PFO or ASD to shunt clot to right arm.  Convert to Eliquis once no further procedures planned.    Sleep apnea  Assessment & Plan  Monitor for nocturnal hypoxia.  Home CPAP brought in by his wife and used last night.    HTN (hypertension)  Assessment & Plan  Monitor vitals.  Continue lisinopril/HCTZ with parameters.         VTE prophylaxis:    therapeutic anticoagulation with weight-based heparin gtt, pharmacy to adjust PRN

## 2024-02-07 NOTE — OR NURSING
Patient A+Ox4.  Denies pain or nausea. VSS. Resp spont and reg.  Planf or patient to return to [id number].  Patient to update his wife.

## 2024-02-07 NOTE — THERAPY
"Occupational Therapy   Initial Evaluation     Patient Name: Jayjay Anders  Age:  58 y.o., Sex:  male  Medical Record #: [id number]  Today's Date: 2/7/2024     Precautions  Precautions: (P) Fall Risk, Non Weight Bearing Right Upper Extremity, Platform Weight Bearing Right Upper Extremity, Immobilizer Left Lower Extremity, Weight Bearing As Tolerated Left Lower Extremity  Comments: (P) pt platform walker, no WB RUE for 7 days    Assessment    Pt seen for OT eval. Pt is a 57 y/o male, found to have acute arterial occlusion in RUE, now s/p thrombectomy and angiogram. Pt 1 week prior (1/30) sustained patella fx while ice skating with surgical repair. Pt provided with educ for ADL performance, safety at home for transfers and mobility with FWW and use of platform for RUE. Pt educ on WB precautions additionally. Pt demo'd good safety awareness, supervised for functional transfers and will have spouse to assist as needed for ADLs. All questions answered. Pt will be safe to dc home when medically stable. Anticipate no further inpt OT needs.     Plan    Occupational Therapy Initial Treatment Plan   Duration: (P) Evaluation only    DC Equipment Recommendations: (P) Reacher, Long Handled Shoehorn  Discharge Recommendations: (P) Anticipate that the patient will have no further occupational therapy needs after discharge from the hospital     Subjective    \"My wife can assist me for Lb dressing\"      Objective       02/07/24 1436   Prior Living Situation   Prior Services Home-Independent   Housing / Facility 3 Story House   Bathroom Set up Walk In Shower;Shower Chair   Equipment Owned Front-Wheel Walker;Grab Bar(s) In Tub / Shower;Tub / Shower Seat;Other (Comments)  (platform attachment)   Lives with - Patient's Self Care Capacity Spouse;Child Less than 18 Years of Age   Comments Pt had been staying in a rental home, now staying in a hotel, ADA accessible rm. Pt will be returning to his own home on 2/10.   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)   History of Falls   History of Falls Yes   Date of Last Fall   (fall while ice skating 1 week ago)   Precautions   Precautions Fall Risk;Non Weight Bearing Right Upper Extremity;Platform Weight Bearing Right Upper Extremity;Immobilizer Left Lower Extremity;Weight Bearing As Tolerated Left Lower Extremity   Comments pt platform walker, no WB RUE for 7 days   Vitals   Pulse Oximetry 98 %   O2 Delivery Device None - Room Air   Pain   Intervention Rest   Pain 0 - 10 Group   Location Arm   Location Orientation Right;Upper   Pain Rating Scale (NPRS) 2   Comfort Goal Comfort with Movement   Therapist Pain Assessment During Activity;Nurse Notified;2   Non Verbal Descriptors   Non Verbal Scale  Calm;Unlabored Breathing   Cognition    Cognition / Consciousness WDL   Level of Consciousness Alert   Passive ROM Upper Body   Passive ROM Upper Body WDL   Active ROM Upper Body   Active ROM Upper Body  WDL   Dominant Hand Right   Strength Upper Body   Upper Body Strength  WDL   Sensation Upper Body   Upper Extremity Sensation  WDL   Upper Body Muscle Tone   Upper Body Muscle Tone  WDL   Neurological Concerns   Neurological Concerns No   Coordination Upper Body   Coordination WDL   Balance Assessment   Sitting Balance (Static) Good   Sitting Balance (Dynamic) Fair   Standing Balance (Static) Fair   Standing Balance (Dynamic) Fair   Weight Shift Sitting Fair   Weight Shift Standing Fair   Comments w/ FWW  platform attachment on RUE   Bed Mobility    Comments was up in chair   ADL Assessment   Eating Independent   Grooming Supervision   Bathing   (declined)   Upper Body Dressing Supervision   Lower Body Dressing Moderate Assist   Toileting   (declined)   Comments Pt reports spouse has and will be assisting for LB dressing   Functional Mobility   Sit to Stand Standby Assist   Bed, Chair, Wheelchair Transfer Standby Assist   Toilet Transfers Standby Assist   Transfer Method Stand Step   Comments w/ FWW platform attachment   Visual Perception   Visual Perception  WDL   Edema / Skin Assessment   Edema / Skin  X   Comments RUE and LLE   Activity Tolerance   Sitting in Chair post session > 30 min   Standing 3 min   Education Group   Education Provided Energy Conservation;Home Safety;Transfers;Role of Occupational Therapist;Activities of Daily Living;Adaptive Equipment   Role of Occupational Therapist Patient Response Patient;Acceptance;Explanation;Demonstration;Verbal Demonstration;Action Demonstration   Energy Conservation Patient Response Patient;Eager;Acceptance;Explanation;Demonstration;Verbal Demonstration;Action Demonstration   Home Safety Patient Response Patient;Acceptance;Eager;Demonstration;Explanation;Verbal Demonstration;Action Demonstration   Transfers Patient Response Patient;Acceptance;Eager;Explanation;Demonstration;Verbal Demonstration;Action Demonstration   ADL Patient Response Patient;Eager;Acceptance;Explanation;Demonstration;Verbal Demonstration;Action Demonstration   Adaptive Equipment Patient Response Patient;Eager;Acceptance;Demonstration;Explanation;Action Demonstration;Verbal Demonstration   Additional Comments educ on use of A/E for LB dressing, transfer safety, WB prec   Occupational Therapy Initial Treatment Plan    Duration Evaluation only   Anticipated Discharge Equipment and Recommendations   DC Equipment Recommendations Reacher;Long Handled Shoehorn   Discharge Recommendations Anticipate  that the patient will have no further occupational therapy needs after discharge from the hospital   Interdisciplinary Plan of Care Collaboration   IDT Collaboration with  Nursing;Physical Therapist   Patient Position at End of Therapy Seated;Call Light within Reach   Collaboration Comments RN and PT updated   Session Information   Date / Session Number  2/7. OT eval only

## 2024-02-07 NOTE — ANESTHESIA PREPROCEDURE EVALUATION
Date/Time: 02/07/24 0930    Scheduled providers: Francis Aguilar M.D.; Clint Muñiz M.D.    Procedure: EC-DOMINIC W/O CONT    Indications: positive bubble study    Location: [facility] - [facility]ocardiology [facility]            Relevant Problems   ANESTHESIA   (positive) Sleep apnea      CARDIAC   (positive) Arm DVT (deep venous thromboembolism), acute, right (HCC)   (positive) Arterial thrombosis (HCC)   (positive) Bilateral pulmonary embolism (HCC)   (positive) HTN (hypertension)   (positive) Left leg DVT (HCC)       Physical Exam    Airway   Mallampati: II  TM distance: >3 FB  Neck ROM: full       Cardiovascular - normal exam  Rhythm: regular  Rate: normal  (-) murmur     Dental - normal exam           Pulmonary - normal exam  Breath sounds clear to auscultation     Abdominal    Neurological - normal exam                   Anesthesia Plan    ASA 2       Plan - MAC               Induction: intravenous    Postoperative Plan: Postoperative administration of opioids is intended.    Pertinent diagnostic labs and testing reviewed    Informed Consent:    Anesthetic plan and risks discussed with patient.

## 2024-02-07 NOTE — DISCHARGE PLANNING
Received Choice Form @: 1300  Agency/Facility Name: [facility]  Referral Sent Per Choice Form @: 130

## 2024-02-07 NOTE — PROGRESS NOTES
4 Eyes Skin Assessment Completed by Penny RN and Jose L RN.    Head WDL  Ears Redness and Blanching  Red cheeks   Nose WDL  Mouth WDL  Neck WDL  Breast/Chest WDL  Shoulder Blades WDL  Spine WDL  (R) Arm/Elbow/Hand right brachial thrombectomy site, covered with island dressing and ace wrap placed by Dr. Orozco  (L) Arm/Elbow/Hand WDL  Abdomen WDL  Groin WDL  Scrotum/Coccyx/Buttocks Redness and Blanching  (R) Leg Scab  (L) Leg Right patellar dressing and leg immobilizer    (R) Heel/Foot/Toe Redness and Blanching  (L) Heel/Foot/Toe Redness and Blanching    Devices In Places Tele Box and Leg Immobilizer    Interventions In Place Gray Ear Foams, NC W/Ear Foams, Pillows, and Heels Loaded W/Pillows    Possible Skin Injury No    Pictures Uploaded Into Epic N/A  Wound Consult Placed N/A  RN Wound Prevention Protocol Ordered No

## 2024-02-07 NOTE — DISCHARGE SUMMARY
Discharge Summary    CHIEF COMPLAINT ON ADMISSION  Chief Complaint   Patient presents with    Numbness     Pt c/o right forearm numbness that began this morning when pt woke up, pt denies injury, hx of L patella surgery 3 weeks.        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  He was found to have an acute arterial occlusion.  He had left displaced patella fracture surgery 1 week prior (1/30/24) after an ice skating incident..  Per report of the wife the patient had in addition had some increasing shortness of breath and is questioning if he had an upper respiratory infection and is really feeling winded.       He was taken to OR by Dr Orozco emergently with a thrombectomy of a large clot.  Upon returning a CT scan of the chest was obtained which showed bilateral pulmonary emboli.  Patient was also found to have left leg DVTs.  High suspicion of paroxysmal thrombus.  Patient did have an echocardiogram which showed normal ejection fraction that showed concern of right to left shunt suggestive of intracardiac ASD or PFO.  There is also an intra-atrial septal aneurysm confirmed on transesophageal echocardiogram.  The septal defect will require future repair per cardiology but not this admit.  Patient will go home with Zio patch for possible arrhythmia monitoring.  He was set up by PT/OT for walker with right arm support.  Vascular surgeon recommended to him not to use the right arm with any moderate lifting over the next week.  His pharmacy had Eliquis in stock and he was able to obtain this medication upon discharge and discussion was had discussing avoidance of asa, nsaids while on Eliquis.      He will follow up with Dr Ledesma, Dr Aguilar, and Dr Orozco.    Therefore, he is discharged in good and stable  condition to home with close outpatient follow-up.    The patient met 2-midnight criteria for an inpatient stay at the time of discharge.    Discharge Date  2/7/2024    FOLLOW UP ITEMS POST DISCHARGE  none    DISCHARGE DIAGNOSES  Principal Problem (Resolved):    Arm DVT (deep venous thromboembolism), acute, right (HCC) (POA: Yes)  Active Problems:    HTN (hypertension) (POA: Unknown)    Sleep apnea (POA: Unknown)    Left leg DVT (HCC) (POA: Unknown)    Bilateral pulmonary embolism (HCC) (POA: Unknown)    Interatrial cardiac shunt (POA: Unknown)    Closed displaced fracture of left patella with routine healing (POA: Unknown)    Hyperglycemia (POA: Unknown)  Resolved Problems:    Arterial thrombosis (HCC) (POA: Yes)      FOLLOW UP  No future appointments.  Raphael Orozco M.D.  [address] [place] [address] [address]  [place] [address]  [phone]    Schedule an appointment as soon as possible for a visit in 2 week(s)      Cassandra Mera P.A.-C.  [address] [facility] [address]  [place] [address]  [phone]    Schedule an appointment as soon as possible for a visit in 1 month(s)  As needed    [place] Aguilar M.D.  [address] [place] [address] [address]  [place] [address]  [phone]    Schedule an appointment as soon as possible for a visit in 2 week(s)        MEDICATIONS ON DISCHARGE     Medication List        START taking these medications        Instructions   * apixaban 5mg Tabs  Commonly known as: Eliquis   Take 2 Tablets by mouth 2 times a day for 14 days. Indications: DVT/PE  Dose: 10 mg     * apixaban 5mg Tabs  Start taking on: February 22, 2024  Commonly known as: Eliquis   Take 1 Tablet by mouth 2 times a day. Indications: DVT/PE  Dose: 5 mg           * This list has 2 medication(s) that are the same as other medications prescribed for you. Read the directions carefully, and ask your doctor or other care provider to review them with you.                CONTINUE taking these medications        Instructions    lisinopril-hydrochlorothiazide 10-12.5 MG per tablet  Commonly known as: Prinzide   Take 1 Tablet by mouth every day.  Dose: 1 Tablet     oxyCODONE-acetaminophen  MG Tabs  Commonly known as: Percocet-10   Take 0.5-1 Tablet by mouth every four hours as needed for Moderate Pain for up to 5 days.  Dose: 0.5-1 Tablet     travoprost 0.004 % Soln  Commonly known as: Travatan Z   Administer 1 Drop into both eyes every day.  Dose: 1 Drop            STOP taking these medications      meloxicam 7.5 MG Tabs  Commonly known as: Mobic              Allergies  No Known Allergies    DIET  Orders Placed This Encounter   Procedures    Diet NPO Restrict to: Sips with Medications     Standing Status:   Standing     Number of Occurrences:   8     Order Specific Question:   Diet NPO Restrict to:     Answer:   Sips with Medications [3]       ACTIVITY  As tolerated.  Weight bearing as tolerated    CONSULTATIONS  Cardiology  Vascular surgery    PROCEDURES  Right arterial thrombectomy  Transesophageal echocardiogram.    LABORATORY  Lab Results   Component Value Date    SODIUM 136 02/07/2024    POTASSIUM 4.2 02/07/2024    CHLORIDE 102 02/07/2024    CO2 25 02/07/2024    GLUCOSE 141 (H) 02/07/2024    BUN 23 (H) 02/07/2024    CREATININE 0.87 02/07/2024        Lab Results   Component Value Date    WBC 14.4 (H) 02/07/2024    HEMOGLOBIN 12.5 (L) 02/07/2024    HEMATOCRIT 36.4 (L) 02/07/2024    PLATELETCT 239 02/07/2024      EC-DOMINIC W/O CONT   Final Result      EC-ECHOCARDIOGRAM COMPLETE W/ CONT   Final Result      CT-CTA CHEST PULMONARY ARTERY W/ RECONS   Final Result      Acute right main and bilateral segmental and subsegmental pulmonary emboli with CT evidence of right heart strain.      Findings were conveyed to Dr. NICOLE MACHUCA on 2/6/2024 11:15 AM.         US-EXTREMITY VENOUS LOWER BILAT   Final Result      DX-PORTABLE FLUORO > 1 HOUR   Final Result      Portable fluoroscopy utilized for 1 minute 53 seconds.         INTERPRETING  LOCATION: [address], [place], [address]      DX-OPERATIVE ANGIOGRAM EACH PROJ   Final Result      Digitized intraoperative radiograph is submitted for review. This examination is not for diagnostic purpose but for guidance during a surgical procedure. Please see the patient's chart for full procedural details.         INTERPRETING LOCATION: [address], [place], [address]      US-EXTREMITY ARTERY UPPER UNILAT RIGHT   Final Result      2/7/24 DOMINIC:Significant atrial septal aneurysm with PFO     2/6/24 Echo:  CONCLUSIONS  No prior study is available for comparison.   Normal left ventricular systolic function.  The left ventricular ejection fraction is visually estimated to be 60%.  No significant valvular abnormalities.   Estimated right ventricular systolic pressure is 20 mmHg.  Interatrial septal aneurysm noted.  Evidence of early (0-5 beats) right to left shunt suggestive of   intracardiac shunt (ASD or PFO).      Total time of the discharge process exceeds 40 minutes.

## 2024-02-07 NOTE — ANESTHESIA POSTPROCEDURE EVALUATION
Patient: Jayjay Anders    Procedure Summary       Date: 02/07/24 Room / Location: [facility] - Echocardiology [facility]    Anesthesia Start: 0918 Anesthesia Stop: 0950    Procedure: EC-DOMINIC W/O CONT Diagnosis: (positive bubble study)    Scheduled Providers: [place] Aguilar M.D.; Clint Muñiz M.D. Responsible Provider: Clint Muñiz M.D.    Anesthesia Type: MAC ASA Status: 2            Final Anesthesia Type: MAC  Last vitals  BP   Blood Pressure: 119/75    Temp   36.7 °C (98.1 °F)    Pulse   (!) 59   Resp   16    SpO2   97 %      Anesthesia Post Evaluation    Patient location during evaluation: PACU  Patient participation: complete - patient participated  Level of consciousness: awake and alert  Pain score: 0    Airway patency: patent  Anesthetic complications: no  Cardiovascular status: hemodynamically stable  Respiratory status: acceptable  Hydration status: euvolemic    PONV: none          No notable events documented.     Nurse Pain Score: 3 (NPRS)

## 2024-02-07 NOTE — ASSESSMENT & PLAN NOTE
I consulted cardiology 2/6  Dr Aguilar and Dr Evans assessing for possible PFO/ASD closure.  2/7 DOMINIC planned

## 2024-02-07 NOTE — PROGRESS NOTES
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  Awake, no complaints.  AF, VSS.    General: Pleasant male in nonapparent distress.  Right upper extremity: Dressing intact.  Mild puffiness around the incision.  Compartments are soft.  Palpable distal radial and ulnar pulses with multiphasic flow.  Left upper extremity: Palpable left distal radial pulses with multiphasic flow.  Abdomen: Soft, nondistended, nontender.  Lower extremities: Palpable pedal pulses bilaterally.  Splint in place in left leg.    Labs: Reviewed.    Assessment:  Status post right arm arterial thrombectomy and angiogram for critical right arm ischemia secondary to paradoxical embolism.    Plan:  Doing well from vascular standpoint.  Management of PFO as per cardiology service.    May be transition to Eliquis anytime from vascular standpoint.    Discussed with Dr. Anders.  All questions were answered.    Discussed with Dr. Gong.    Dr. Moreno will be on service starting tomorrow.

## 2024-02-07 NOTE — PROGRESS NOTES
Bedside report received from MEERA Domingo. Patient is alert and oriented x  4, 2L O2 via NC in use, SR on the monitor. Fall precautions are in place. Call light is in reach.

## 2024-02-07 NOTE — PROGRESS NOTES
58yoM with left patella fx ORIF 1/30.  Readmitted with LLE DVT, PE and right brachial/axillary arterial thromboembolism due to PFO.  Now s/p thrombectomy by Dr. Orozco and therapeutic anticoagulation.    S: Doing okay, seems in good spirits.  Left leg sore.  Right arm was numb and painful but now much better with resolved numbness after thrombectomy.    O:  Vitals:    02/07/24 0402   BP: 126/71   Pulse: 65   Resp: 16   Temp: 36.5 °C (97.7 °F)   SpO2: 99%     Exam:  General-NAD, alert and oriented, following commands  LLE-hinged knee brace in place, aquacel dressing in place, mild diffuse swelling and ecchymosis at the knee, NVI distally  RUE-dressing at forearm c/d/I, NVI distally    A: 58yoM with left patella fx ORIF 1/30.  Readmitted with LLE DVT, PE and right brachial/axillary arterial thromboembolism due to PFO.  Now s/p thrombectomy by Dr. Orozco and therapeutic anticoagulation.    Recs:  --PT/OT when otherwise clinically appropriate  --okay for WBAT LLE in hinged knee brace locked in extension, no knee flexion  --WBAT RUE if otherwise okay per Dr. Orozco, may benefit from platform walker for WB thru right forearm  --continue therapeutic anticoagulation per hospitalist service  --fu as scheduled next week for xrays left knee and staple removal

## 2024-02-07 NOTE — ANESTHESIA TIME REPORT
Anesthesia Start and Stop Event Times       Date Time Event    2/7/2024 0917 Ready for Procedure     0918 Anesthesia Start     0950 Anesthesia Stop          Responsible Staff  02/07/24      Name Role Begin End    Clint Muñiz M.D. Anesth 0918 0950          Overtime Reason:  no overtime (within assigned shift)    Comments:

## 2024-02-07 NOTE — PROGRESS NOTES
14 day Zio cardiac monitor placed on patient. Close cardiology follow up arranged as below:    Future Appointments   Date Time Provider Department Center   2/28/2024  9:20 AM Eduardo Evans M.D. [facility] None

## 2024-02-07 NOTE — ASSESSMENT & PLAN NOTE
Echocardiogram shows possible ASD PFO with shunt on bubble study.  DOMINIC for further investigation  Cardiology consulting  Monitor on telemetry for possible arrhythmia  Anticoagulation with heparin for now.  Monitor respiratory status

## 2024-02-07 NOTE — PROGRESS NOTES
NO HARD CHART:       Patient to procedure Room for DOMINIC with no hard chart, only loose papers. Per Floor RN, no hard chart available. Procedure RN completed Pre-Procedure Consent paperwork packet, including: WHO Yellow Sheet signed by RN and MD, Informed consent for DOMINIC procedure signed by RN, patient, and Cardiology, and Informed consent for Anesthesia signed by Anesthesia MD and patient. Procedure Packet secured to loose papers and hand delivered to receiving PACU RN who acknowledged receipt and no hard chart.

## 2024-02-08 ENCOUNTER — TELEPHONE (OUTPATIENT)
Dept: CARDIOLOGY | Facility: MEDICAL CENTER | Age: 59
End: 2024-02-08
Payer: COMMERCIAL

## 2024-02-08 NOTE — THERAPY
Physical Therapy   Initial Evaluation     Patient Name: Jayjay Anders  Age:  58 y.o., Sex:  male  Medical Record #: [id number]  Today's Date: 2/7/2024     Precautions  Precautions: Fall Risk;Non Weight Bearing Right Upper Extremity;Platform Weight Bearing Right Upper Extremity;Immobilizer Left Lower Extremity;Weight Bearing As Tolerated Left Lower Extremity  Comments: pt platform walker, no WB RUE for 7 days    Assessment  Patient is 58 y.o. male with recent L patella fx and ORIF on 1/30 was admitted with LLE DVTs, B PE and right brachial/axillary arterial thromboembolism. Pt is now s/p R UE thrombectomy and on heparin drip-Heparin Xa therapeutic. Pt reports improvement in R UE deficits which were present prior to admission. Pt did not receive PT evaluation post ORIF therefore address mobility concerns for LLE restrictions. He returned home with a FWW and reported bumping up the stairs with difficulty getting up off floor.     Today, pt was able to demonstrate transfers and gait with PFWW. Instructed pt on stair negotiation with use of L handrail. Pt instructed on LLE exercises including ankle pumps, quad and glut isometrics, and SLR. Pt encouraged to follow up with OP PT once cleared for ROM. No further acute PT needs.     Plan    Physical Therapy Initial Treatment Plan   Duration: Evaluation only    DC Equipment Recommendations: Front-Wheel Walker (platform)  Discharge Recommendations: Recommend outpatient physical therapy services to address higher level deficits          02/07/24 1419   Vitals   O2 (LPM) 0   O2 Delivery Device None - Room Air   Vitals Comments SpO2 remained >90% on RA   Pain 0 - 10 Group   Therapist Pain Assessment During Activity;Nurse Notified  (non reported)   Non Verbal Descriptors   Non Verbal Scale  Calm   Prior Living Situation   Prior Services Home-Independent   Housing / Facility 3 Story House   Steps Into Home 5   Rail Left Rail  (Steps into Home)   Bathroom Set up Walk In  Shower;Shower Chair   Equipment Owned Front-Wheel Walker   Lives with - Patient's Self Care Capacity Spouse;Child Less than 18 Years of Age   Comments Pt has been staying in a rental home due to home repairs. They are planning on staying in an ADA hotel room until saturday when they return to their home. Their home is a tri level home. Wife will be able to assist as needed   Prior Level of Functional Mobility   Bed Mobility Other (Comments)   Comments independent prior to knee injury. since, pt has been using a FWW with limited mobility. He was bumping up the steps and reported having a hard time getting off the ground with immobilizer   History of Falls   History of Falls Yes   Date of Last Fall   (ice skating)   Cognition    Cognition / Consciousness WDL   Active ROM Lower Body    Active ROM Lower Body  X   Comments L knee immobilized   Strength Lower Body   Lower Body Strength  X   Comments unable to perform hip flexion with LLE   Other Treatments   Other Treatments Provided education provided on stair negotiation, isometrics, ankle pumps, and hip exercises   Balance Assessment   Sitting Balance (Static) Fair +   Sitting Balance (Dynamic) Fair +   Standing Balance (Static) Fair   Standing Balance (Dynamic) Fair   Weight Shift Sitting Good   Weight Shift Standing Fair   Comments PFWW   Bed Mobility    Comments in chair pre and post   Gait Analysis   Gait Level Of Assist Supervised   Assistive Device Platform Walker   Distance (Feet) 40   # of Times Distance was Traveled 1   Deviation Antalgic;Step To   # of Stairs Climbed 3   Level of Assist with Stairs Standby Assist   Comments cues for sequencing for steps   Functional Mobility   Sit to Stand Supervised   Bed, Chair, Wheelchair Transfer Supervised   Transfer Method Stand Step   Mobility in room with platform walker   Edema / Skin Assessment   Edema / Skin  X   Comments R UE edema   Education Group   Education Provided Role of Physical Therapist;Gait  Training;Stair Training;Use of Assistive Device;Exercises - Seated   Role of Physical Therapist Patient Response Patient;Acceptance;Explanation;Demonstration;Action Demonstration;Verbal Demonstration   Gait Training Patient Response Patient;Acceptance;Explanation;Demonstration;Verbal Demonstration;Action Demonstration   Stair Training Patient Response Patient;Acceptance;Explanation;Demonstration;Verbal Demonstration;Action Demonstration   Use of Assistive Device Patient Response Patient;Acceptance;Explanation;Demonstration;Verbal Demonstration;Action Demonstration   Exercises - Seated Patient Response Patient;Acceptance;Explanation;Demonstration;Verbal Demonstration;Action Demonstration   Physical Therapy Initial Treatment Plan    Duration Evaluation only   Anticipated Discharge Equipment and Recommendations   DC Equipment Recommendations Front-Wheel Walker  (platform)   Discharge Recommendations Recommend outpatient physical therapy services to address higher level deficits

## 2024-02-09 NOTE — TELEPHONE ENCOUNTER
Referral from: Dr. Aguilar for PFO    Patient called on 2/8/2024.    Confirmed upcoming consultation with Dr. Evans.     All questions answered. Patient verbalizes understanding.

## 2024-02-12 ENCOUNTER — TELEPHONE (OUTPATIENT)
Dept: CARDIOLOGY | Facility: MEDICAL CENTER | Age: 59
End: 2024-02-12
Payer: COMMERCIAL

## 2024-02-12 DIAGNOSIS — Q24.8 INTERATRIAL CARDIAC SHUNT: ICD-10-CM

## 2024-02-12 NOTE — TELEPHONE ENCOUNTER
BE    Caller:  Jayjay    Topic/issue: Jayjay is requesting a Cardiac MRI, as referred by BE.     Callback Number: [phone]    Thank you,   Sunshine SCANLON

## 2024-02-12 NOTE — TELEPHONE ENCOUNTER
Francis Aguilar M.D.  Jessica Simmons R.N.  Can you get Dr. Anders a cardiac MRI ordered. Study is to look for anomalous pulmonary venous return.    Thanks  BE    Phone Number Called: [phone]    Call outcome: Spoke to patient regarding message below.    Message: Called patient back to let him know that this RN placed cardiac MRI order. All questions answered at this time. Advised to call back with any further questions or concerns.

## 2024-02-15 ENCOUNTER — OFFICE VISIT (OUTPATIENT)
Dept: VASCULAR SURGERY | Facility: MEDICAL CENTER | Age: 59
End: 2024-02-15
Payer: COMMERCIAL

## 2024-02-15 VITALS
OXYGEN SATURATION: 98 % | WEIGHT: 225 LBS | HEART RATE: 70 BPM | TEMPERATURE: 97 F | HEIGHT: 73 IN | SYSTOLIC BLOOD PRESSURE: 112 MMHG | DIASTOLIC BLOOD PRESSURE: 64 MMHG | BODY MASS INDEX: 29.82 KG/M2

## 2024-02-15 DIAGNOSIS — I74.9 ARTERIAL THROMBOEMBOLISM (HCC): ICD-10-CM

## 2024-02-15 PROCEDURE — 3078F DIAST BP <80 MM HG: CPT | Performed by: SURGERY

## 2024-02-15 PROCEDURE — 99024 POSTOP FOLLOW-UP VISIT: CPT | Performed by: SURGERY

## 2024-02-15 PROCEDURE — 3074F SYST BP LT 130 MM HG: CPT | Performed by: SURGERY

## 2024-02-15 ASSESSMENT — FIBROSIS 4 INDEX: FIB4 SCORE: 1.45

## 2024-02-16 ENCOUNTER — TELEPHONE (OUTPATIENT)
Dept: CARDIOLOGY | Facility: MEDICAL CENTER | Age: 59
End: 2024-02-16
Payer: COMMERCIAL

## 2024-02-16 NOTE — PROGRESS NOTES
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  He has no complaints.  He has been on Eliquis.  He has been followed by cardiology service for his cardiac issue.       Vitals  /64 (BP Location: Left arm, Patient Position: Sitting, BP Cuff Size: Adult long)   Pulse 70   Temp 36.1 °C (97 °F) (Temporal)   Ht 1.854 m (6' 1\")   Wt 102 kg (225 lb)   SpO2 98%   BMI 29.69 kg/m²     Exam  General: Pleasant male in nonapparent distress.  Right upper extremity: Incision is well-healed without erythema or drainage.  There is small hematoma underneath.  Scattered resolving ecchymosis.  Palpable right distal radial and ulnar pulses.  Compartments are soft.  Hand and fingers are pink, warm, well-perfused.  Intact sensorimotor function.    Assessment: Postop.    Plan: Doing well from vascular standpoint.  I removed the sutures in the office.  I answered all of Dr. Anders's questions.  I will see him back as needed.  He knows to contact me for any problem with his arm at any time.      Raphael Orozco MD  [facility]  [phone]  [address], [place] [address]    "

## 2024-02-16 NOTE — TELEPHONE ENCOUNTER
----- Message from Francis Aguilar M.D. sent at 2/16/2024 11:45 AM PST -----  Im going to have Dr. Anders skip the MRI of the heart. Can you cancel. Ill let him know.     Thanks  BE

## 2024-02-16 NOTE — TELEPHONE ENCOUNTER
Phone Number Called: [phone]    Call outcome:  imaging department     Message: Called imaging department and cardiac MRI cancelled at this time per BE request.

## 2024-02-26 ENCOUNTER — TELEPHONE (OUTPATIENT)
Dept: CARDIOLOGY | Facility: MEDICAL CENTER | Age: 59
End: 2024-02-26
Payer: COMMERCIAL

## 2024-02-26 DIAGNOSIS — I70.90 ARTERIAL OCCLUSION: ICD-10-CM

## 2024-02-29 ENCOUNTER — OFFICE VISIT (OUTPATIENT)
Dept: CARDIOLOGY | Facility: MEDICAL CENTER | Age: 59
End: 2024-02-29
Attending: NURSE PRACTITIONER
Payer: COMMERCIAL

## 2024-02-29 VITALS
HEIGHT: 73 IN | RESPIRATION RATE: 14 BRPM | BODY MASS INDEX: 32.07 KG/M2 | WEIGHT: 242 LBS | DIASTOLIC BLOOD PRESSURE: 80 MMHG | OXYGEN SATURATION: 98 % | SYSTOLIC BLOOD PRESSURE: 116 MMHG | HEART RATE: 65 BPM

## 2024-02-29 DIAGNOSIS — Q21.12 PFO WITH ATRIAL SEPTAL ANEURYSM: ICD-10-CM

## 2024-02-29 DIAGNOSIS — I25.3 PFO WITH ATRIAL SEPTAL ANEURYSM: ICD-10-CM

## 2024-02-29 PROCEDURE — 3074F SYST BP LT 130 MM HG: CPT | Performed by: INTERNAL MEDICINE

## 2024-02-29 PROCEDURE — 99211 OFF/OP EST MAY X REQ PHY/QHP: CPT | Performed by: INTERNAL MEDICINE

## 2024-02-29 PROCEDURE — 3079F DIAST BP 80-89 MM HG: CPT | Performed by: INTERNAL MEDICINE

## 2024-02-29 PROCEDURE — 99215 OFFICE O/P EST HI 40 MIN: CPT | Performed by: INTERNAL MEDICINE

## 2024-02-29 ASSESSMENT — FIBROSIS 4 INDEX: FIB4 SCORE: 1.45

## 2024-02-29 NOTE — PROGRESS NOTES
"      Interventional cardiology Follow-up Consultation Note        CC: PFO and parodoxical emboli    Patient ID/HPI:   58 year old male patient with DVT, PE and paardoxical emboli to arm needing thrombectomy here to discuss PFO closure.        Past Medical History:   Diagnosis Date    Cancer (HCC) 09/22/2022    melanoma    Glaucoma     Hypertension     Melanoma of back (HCC) 09/26/2022    Pain     left knee    Sleep apnea     Snoring          Current Outpatient Medications   Medication Sig Dispense Refill    apixaban (ELIQUIS) 5mg Tab Take 1 Tablet by mouth 2 times a day. Indications: DVT/PE 60 Tablet 5    lisinopril-hydrochlorothiazide (PRINZIDE) 10-12.5 MG per tablet Take 1 Tablet by mouth every day.      travoprost (TRAVATAN Z) 0.004 % Solution Administer 1 Drop into both eyes every day.       No current facility-administered medications for this visit.         Physical Exam:  Ambulatory Vitals  /80 (BP Location: Left arm, Patient Position: Sitting, BP Cuff Size: Adult)   Pulse 65   Resp 14   Ht 1.854 m (6' 1\")   Wt 110 kg (242 lb)   SpO2 98%    Oxygen Therapy:  Pulse Oximetry: 98 %  BP Readings from Last 4 Encounters:   02/29/24 116/80   02/15/24 112/64   02/07/24 124/69   01/30/24 (!) 160/77       Weight/BMI: Body mass index is 31.93 kg/m².  Wt Readings from Last 4 Encounters:   02/29/24 110 kg (242 lb)   02/15/24 102 kg (225 lb)   02/07/24 125 kg (275 lb 9.2 oz)   01/30/24 120 kg (264 lb 1.8 oz)       General: Well appearing and in no apparent distress  Neck: JVP absent, carotid bruits absent  Lungs: respiratory sounds  normal, additional breath sounds absent  Heart: Regular rhythm,   No palpable thrills on palpation, murmurs absent, no rubs,   Lower extremity edema absent.       DOMINIC reviewed and independently interpreted  Extremely aneurysmal septum with PFO.    Discussed with       Medical Decision Making:  Problem List Items Addressed This Visit    None  Visit Diagnoses       PFO with " atrial septal aneurysm        Relevant Orders    MR-BRAIN-W/O          58 year old male patient with high risk PFO and paradoxical emboli.  Atrial septum is very aneurysmal technically might be challenging.  After much discussion, we decided to proceed with PFO closure to prevent future embolic events.  Risk benefits of the procedure discussed in detail.      With 60 minutes of time spent for this visit today, discussed with .  Also briefly discussed with her CT surgeon Dr. Valdez given complexity of his aneurysmal septum.        Eduardo GUZMAN  Interventional cardiologist  [facility], [facility]  [address] [address]  Phone: [phone]  Fax: [phone]

## 2024-03-04 ENCOUNTER — TELEPHONE (OUTPATIENT)
Dept: CARDIOLOGY | Facility: MEDICAL CENTER | Age: 59
End: 2024-03-04
Payer: COMMERCIAL

## 2024-03-04 NOTE — TELEPHONE ENCOUNTER
Schedule PFO closure w/DOMINIC w/cardiac anesthesia Cole Connolly with . Tony w/SOLOMON Rep. Emailed Dr. Dunham to Jessica to have .

## 2024-03-05 NOTE — TELEPHONE ENCOUNTER
Patient is scheduled on 3-15-24 for a PFO closure w/cardiac anesthesia Dr. Cole Connolly with . Patient was told to hold eliquis for 3 days prior.Patient to check in at 6:00 for a 7:30 procedure.H&P was done on 2-29-24 by Dr. Evans. Pre admit to call patient. Spoke to Artie with anesthesia and she scheduled for Mohsen to be at procedure. Tony CARBAJAL notified by Email to be at procedure.

## 2024-03-05 NOTE — TELEPHONE ENCOUNTER
AK  Caller: Jayjay Anders    Topic/issue: Patient calling to schedule his PFO. Patient hadn't heard anything and wanted to check in to see if he could schedule.     Callback Number: [phone]    Thank you,  Velma MARLOW

## 2024-03-06 ENCOUNTER — APPOINTMENT (OUTPATIENT)
Dept: ADMISSIONS | Facility: MEDICAL CENTER | Age: 59
End: 2024-03-06
Attending: INTERNAL MEDICINE
Payer: COMMERCIAL

## 2024-03-11 ENCOUNTER — PRE-ADMISSION TESTING (OUTPATIENT)
Dept: ADMISSIONS | Facility: MEDICAL CENTER | Age: 59
End: 2024-03-11
Attending: INTERNAL MEDICINE
Payer: COMMERCIAL

## 2024-03-11 DIAGNOSIS — Z01.812 PRE-OPERATIVE LABORATORY EXAMINATION: Primary | ICD-10-CM

## 2024-03-11 DIAGNOSIS — Z01.810 PRE-OPERATIVE CARDIOVASCULAR EXAMINATION: ICD-10-CM

## 2024-03-12 ENCOUNTER — PRE-ADMISSION TESTING (OUTPATIENT)
Dept: ADMISSIONS | Facility: MEDICAL CENTER | Age: 59
End: 2024-03-12
Attending: INTERNAL MEDICINE
Payer: COMMERCIAL

## 2024-03-12 DIAGNOSIS — Z01.810 PRE-OPERATIVE CARDIOVASCULAR EXAMINATION: ICD-10-CM

## 2024-03-12 DIAGNOSIS — Z01.812 PRE-OPERATIVE LABORATORY EXAMINATION: ICD-10-CM

## 2024-03-12 LAB
ALBUMIN SERPL BCP-MCNC: 4.3 G/DL (ref 3.2–4.9)
ALBUMIN/GLOB SERPL: 1.5 G/DL
ALP SERPL-CCNC: 77 U/L (ref 30–99)
ALT SERPL-CCNC: 21 U/L (ref 2–50)
ANION GAP SERPL CALC-SCNC: 12 MMOL/L (ref 7–16)
AST SERPL-CCNC: 25 U/L (ref 12–45)
BILIRUB SERPL-MCNC: 1.6 MG/DL (ref 0.1–1.5)
BUN SERPL-MCNC: 12 MG/DL (ref 8–22)
CALCIUM ALBUM COR SERPL-MCNC: 9.2 MG/DL (ref 8.5–10.5)
CALCIUM SERPL-MCNC: 9.4 MG/DL (ref 8.5–10.5)
CHLORIDE SERPL-SCNC: 103 MMOL/L (ref 96–112)
CO2 SERPL-SCNC: 23 MMOL/L (ref 20–33)
CREAT SERPL-MCNC: 0.83 MG/DL (ref 0.5–1.4)
ERYTHROCYTE [DISTWIDTH] IN BLOOD BY AUTOMATED COUNT: 41.4 FL (ref 35.9–50)
GFR SERPLBLD CREATININE-BSD FMLA CKD-EPI: 101 ML/MIN/1.73 M 2
GLOBULIN SER CALC-MCNC: 2.9 G/DL (ref 1.9–3.5)
GLUCOSE SERPL-MCNC: 101 MG/DL (ref 65–99)
HCT VFR BLD AUTO: 40.7 % (ref 42–52)
HGB BLD-MCNC: 14.2 G/DL (ref 14–18)
INR PPP: 1.14 (ref 0.87–1.13)
MCH RBC QN AUTO: 31.4 PG (ref 27–33)
MCHC RBC AUTO-ENTMCNC: 34.9 G/DL (ref 32.3–36.5)
MCV RBC AUTO: 90 FL (ref 81.4–97.8)
PLATELET # BLD AUTO: 247 K/UL (ref 164–446)
PMV BLD AUTO: 9.6 FL (ref 9–12.9)
POTASSIUM SERPL-SCNC: 3.9 MMOL/L (ref 3.6–5.5)
PROT SERPL-MCNC: 7.2 G/DL (ref 6–8.2)
PROTHROMBIN TIME: 14.7 SEC (ref 12–14.6)
RBC # BLD AUTO: 4.52 M/UL (ref 4.7–6.1)
SODIUM SERPL-SCNC: 138 MMOL/L (ref 135–145)
WBC # BLD AUTO: 6.4 K/UL (ref 4.8–10.8)

## 2024-03-12 PROCEDURE — 36415 COLL VENOUS BLD VENIPUNCTURE: CPT

## 2024-03-12 PROCEDURE — 85610 PROTHROMBIN TIME: CPT

## 2024-03-12 PROCEDURE — 80053 COMPREHEN METABOLIC PANEL: CPT

## 2024-03-12 PROCEDURE — 85027 COMPLETE CBC AUTOMATED: CPT

## 2024-03-12 PROCEDURE — 93005 ELECTROCARDIOGRAM TRACING: CPT

## 2024-03-13 LAB — EKG IMPRESSION: NORMAL

## 2024-03-13 PROCEDURE — 93010 ELECTROCARDIOGRAM REPORT: CPT | Performed by: INTERNAL MEDICINE

## 2024-03-15 ENCOUNTER — HOSPITAL ENCOUNTER (OUTPATIENT)
Facility: MEDICAL CENTER | Age: 59
End: 2024-03-16
Attending: INTERNAL MEDICINE | Admitting: INTERNAL MEDICINE
Payer: COMMERCIAL

## 2024-03-15 ENCOUNTER — APPOINTMENT (OUTPATIENT)
Dept: CARDIOLOGY | Facility: MEDICAL CENTER | Age: 59
End: 2024-03-15
Attending: ANESTHESIOLOGY
Payer: COMMERCIAL

## 2024-03-15 ENCOUNTER — APPOINTMENT (OUTPATIENT)
Dept: CARDIOLOGY | Facility: MEDICAL CENTER | Age: 59
End: 2024-03-15
Attending: INTERNAL MEDICINE
Payer: COMMERCIAL

## 2024-03-15 ENCOUNTER — ANESTHESIA (OUTPATIENT)
Dept: CARDIOLOGY | Facility: MEDICAL CENTER | Age: 59
End: 2024-03-15
Payer: COMMERCIAL

## 2024-03-15 ENCOUNTER — ANESTHESIA EVENT (OUTPATIENT)
Dept: CARDIOLOGY | Facility: MEDICAL CENTER | Age: 59
End: 2024-03-15
Payer: COMMERCIAL

## 2024-03-15 DIAGNOSIS — I25.3 PFO WITH ATRIAL SEPTAL ANEURYSM: ICD-10-CM

## 2024-03-15 DIAGNOSIS — Q21.12 PFO WITH ATRIAL SEPTAL ANEURYSM: ICD-10-CM

## 2024-03-15 LAB
ACT BLD: 223 SEC (ref 74–137)
ACT BLD: 250 SEC (ref 74–137)
LV EJECT FRACT  99904: 55

## 2024-03-15 PROCEDURE — C1894 INTRO/SHEATH, NON-LASER: HCPCS

## 2024-03-15 PROCEDURE — 700101 HCHG RX REV CODE 250

## 2024-03-15 PROCEDURE — 700105 HCHG RX REV CODE 258: Performed by: ANESTHESIOLOGY

## 2024-03-15 PROCEDURE — 700102 HCHG RX REV CODE 250 W/ 637 OVERRIDE(OP): Performed by: INTERNAL MEDICINE

## 2024-03-15 PROCEDURE — 700111 HCHG RX REV CODE 636 W/ 250 OVERRIDE (IP): Performed by: ANESTHESIOLOGY

## 2024-03-15 PROCEDURE — G0378 HOSPITAL OBSERVATION PER HR: HCPCS

## 2024-03-15 PROCEDURE — A9270 NON-COVERED ITEM OR SERVICE: HCPCS | Performed by: INTERNAL MEDICINE

## 2024-03-15 PROCEDURE — 85347 COAGULATION TIME ACTIVATED: CPT

## 2024-03-15 PROCEDURE — 700111 HCHG RX REV CODE 636 W/ 250 OVERRIDE (IP)

## 2024-03-15 PROCEDURE — 160002 HCHG RECOVERY MINUTES (STAT)

## 2024-03-15 PROCEDURE — 93580 TRANSCATH CLOSURE OF ASD: CPT | Performed by: INTERNAL MEDICINE

## 2024-03-15 PROCEDURE — 93319 3D ECHO IMG CGEN CAR ANOMAL: CPT

## 2024-03-15 PROCEDURE — 160035 HCHG PACU - 1ST 60 MINS PHASE I

## 2024-03-15 PROCEDURE — 700105 HCHG RX REV CODE 258: Performed by: INTERNAL MEDICINE

## 2024-03-15 PROCEDURE — 700101 HCHG RX REV CODE 250: Performed by: ANESTHESIOLOGY

## 2024-03-15 RX ORDER — OXYCODONE HCL 5 MG/5 ML
10 SOLUTION, ORAL ORAL
Status: DISCONTINUED | OUTPATIENT
Start: 2024-03-15 | End: 2024-03-15 | Stop reason: HOSPADM

## 2024-03-15 RX ORDER — HYDROCHLOROTHIAZIDE 25 MG/1
12.5 TABLET ORAL
Status: DISCONTINUED | OUTPATIENT
Start: 2024-03-15 | End: 2024-03-16 | Stop reason: HOSPADM

## 2024-03-15 RX ORDER — HEPARIN SODIUM 1000 [USP'U]/ML
INJECTION, SOLUTION INTRAVENOUS; SUBCUTANEOUS
Status: COMPLETED
Start: 2024-03-15 | End: 2024-03-15

## 2024-03-15 RX ORDER — CEFAZOLIN SODIUM 1 G/3ML
INJECTION, POWDER, FOR SOLUTION INTRAMUSCULAR; INTRAVENOUS PRN
Status: DISCONTINUED | OUTPATIENT
Start: 2024-03-15 | End: 2024-03-15 | Stop reason: SURG

## 2024-03-15 RX ORDER — SODIUM CHLORIDE 9 MG/ML
1.5 INJECTION, SOLUTION INTRAVENOUS CONTINUOUS
Status: ACTIVE | OUTPATIENT
Start: 2024-03-15 | End: 2024-03-15

## 2024-03-15 RX ORDER — HYDROMORPHONE HYDROCHLORIDE 1 MG/ML
0.1 INJECTION, SOLUTION INTRAMUSCULAR; INTRAVENOUS; SUBCUTANEOUS
Status: DISCONTINUED | OUTPATIENT
Start: 2024-03-15 | End: 2024-03-15 | Stop reason: HOSPADM

## 2024-03-15 RX ORDER — SODIUM CHLORIDE, SODIUM LACTATE, POTASSIUM CHLORIDE, CALCIUM CHLORIDE 600; 310; 30; 20 MG/100ML; MG/100ML; MG/100ML; MG/100ML
INJECTION, SOLUTION INTRAVENOUS
Status: DISCONTINUED | OUTPATIENT
Start: 2024-03-15 | End: 2024-03-15 | Stop reason: SURG

## 2024-03-15 RX ORDER — SODIUM CHLORIDE, SODIUM LACTATE, POTASSIUM CHLORIDE, CALCIUM CHLORIDE 600; 310; 30; 20 MG/100ML; MG/100ML; MG/100ML; MG/100ML
INJECTION, SOLUTION INTRAVENOUS CONTINUOUS
Status: DISCONTINUED | OUTPATIENT
Start: 2024-03-15 | End: 2024-03-15

## 2024-03-15 RX ORDER — ASPIRIN 81 MG/1
81 TABLET ORAL DAILY
Status: DISCONTINUED | OUTPATIENT
Start: 2024-03-16 | End: 2024-03-16 | Stop reason: HOSPADM

## 2024-03-15 RX ORDER — HEPARIN SODIUM 200 [USP'U]/100ML
INJECTION, SOLUTION INTRAVENOUS
Status: COMPLETED
Start: 2024-03-15 | End: 2024-03-15

## 2024-03-15 RX ORDER — HALOPERIDOL 5 MG/ML
1 INJECTION INTRAMUSCULAR
Status: DISCONTINUED | OUTPATIENT
Start: 2024-03-15 | End: 2024-03-15 | Stop reason: HOSPADM

## 2024-03-15 RX ORDER — ONDANSETRON 2 MG/ML
4 INJECTION INTRAMUSCULAR; INTRAVENOUS
Status: DISCONTINUED | OUTPATIENT
Start: 2024-03-15 | End: 2024-03-15 | Stop reason: HOSPADM

## 2024-03-15 RX ORDER — DIPHENHYDRAMINE HYDROCHLORIDE 50 MG/ML
12.5 INJECTION INTRAMUSCULAR; INTRAVENOUS
Status: DISCONTINUED | OUTPATIENT
Start: 2024-03-15 | End: 2024-03-15 | Stop reason: HOSPADM

## 2024-03-15 RX ORDER — LISINOPRIL AND HYDROCHLOROTHIAZIDE 12.5; 1 MG/1; MG/1
1 TABLET ORAL EVERY MORNING
Status: DISCONTINUED | OUTPATIENT
Start: 2024-03-16 | End: 2024-03-15

## 2024-03-15 RX ORDER — ASPIRIN 325 MG
325 TABLET ORAL ONCE
Status: COMPLETED | OUTPATIENT
Start: 2024-03-15 | End: 2024-03-15

## 2024-03-15 RX ORDER — HYDROMORPHONE HYDROCHLORIDE 1 MG/ML
0.2 INJECTION, SOLUTION INTRAMUSCULAR; INTRAVENOUS; SUBCUTANEOUS
Status: DISCONTINUED | OUTPATIENT
Start: 2024-03-15 | End: 2024-03-15 | Stop reason: HOSPADM

## 2024-03-15 RX ORDER — LIDOCAINE HYDROCHLORIDE 40 MG/ML
SOLUTION TOPICAL PRN
Status: DISCONTINUED | OUTPATIENT
Start: 2024-03-15 | End: 2024-03-15 | Stop reason: SURG

## 2024-03-15 RX ORDER — OXYCODONE HCL 5 MG/5 ML
5 SOLUTION, ORAL ORAL
Status: DISCONTINUED | OUTPATIENT
Start: 2024-03-15 | End: 2024-03-15 | Stop reason: HOSPADM

## 2024-03-15 RX ORDER — MEPERIDINE HYDROCHLORIDE 25 MG/ML
6.25 INJECTION INTRAMUSCULAR; INTRAVENOUS; SUBCUTANEOUS
Status: DISCONTINUED | OUTPATIENT
Start: 2024-03-15 | End: 2024-03-15 | Stop reason: HOSPADM

## 2024-03-15 RX ORDER — DEXAMETHASONE SODIUM PHOSPHATE 4 MG/ML
INJECTION, SOLUTION INTRA-ARTICULAR; INTRALESIONAL; INTRAMUSCULAR; INTRAVENOUS; SOFT TISSUE PRN
Status: DISCONTINUED | OUTPATIENT
Start: 2024-03-15 | End: 2024-03-15 | Stop reason: SURG

## 2024-03-15 RX ORDER — HYDROMORPHONE HYDROCHLORIDE 1 MG/ML
0.4 INJECTION, SOLUTION INTRAMUSCULAR; INTRAVENOUS; SUBCUTANEOUS
Status: DISCONTINUED | OUTPATIENT
Start: 2024-03-15 | End: 2024-03-15 | Stop reason: HOSPADM

## 2024-03-15 RX ORDER — LIDOCAINE HYDROCHLORIDE 20 MG/ML
INJECTION, SOLUTION INFILTRATION; PERINEURAL
Status: COMPLETED
Start: 2024-03-15 | End: 2024-03-15

## 2024-03-15 RX ORDER — LISINOPRIL 10 MG/1
10 TABLET ORAL
Status: DISCONTINUED | OUTPATIENT
Start: 2024-03-15 | End: 2024-03-16 | Stop reason: HOSPADM

## 2024-03-15 RX ORDER — LIDOCAINE HYDROCHLORIDE 40 MG/ML
SOLUTION TOPICAL
Status: COMPLETED
Start: 2024-03-15 | End: 2024-03-15

## 2024-03-15 RX ADMIN — SODIUM CHLORIDE, POTASSIUM CHLORIDE, SODIUM LACTATE AND CALCIUM CHLORIDE: 600; 310; 30; 20 INJECTION, SOLUTION INTRAVENOUS at 07:50

## 2024-03-15 RX ADMIN — SUGAMMADEX 200 MG: 100 INJECTION, SOLUTION INTRAVENOUS at 09:42

## 2024-03-15 RX ADMIN — HEPARIN SODIUM 10000 UNITS: 1000 INJECTION, SOLUTION INTRAVENOUS; SUBCUTANEOUS at 09:32

## 2024-03-15 RX ADMIN — ROCURONIUM BROMIDE 5 MG: 10 INJECTION, SOLUTION INTRAVENOUS at 09:21

## 2024-03-15 RX ADMIN — LIDOCAINE HYDROCHLORIDE: 20 INJECTION, SOLUTION INFILTRATION; PERINEURAL at 07:39

## 2024-03-15 RX ADMIN — ASPIRIN 325 MG: 325 TABLET ORAL at 10:32

## 2024-03-15 RX ADMIN — HYDROCHLOROTHIAZIDE 12.5 MG: 25 TABLET ORAL at 13:27

## 2024-03-15 RX ADMIN — HEPARIN SODIUM 6000 UNITS: 1000 INJECTION, SOLUTION INTRAVENOUS; SUBCUTANEOUS at 09:32

## 2024-03-15 RX ADMIN — CEFAZOLIN 2 G: 1 INJECTION, POWDER, FOR SOLUTION INTRAMUSCULAR; INTRAVENOUS at 08:10

## 2024-03-15 RX ADMIN — ROCURONIUM BROMIDE 80 MG: 10 INJECTION, SOLUTION INTRAVENOUS at 07:54

## 2024-03-15 RX ADMIN — DEXAMETHASONE SODIUM PHOSPHATE 8 MG: 4 INJECTION INTRA-ARTICULAR; INTRALESIONAL; INTRAMUSCULAR; INTRAVENOUS; SOFT TISSUE at 08:27

## 2024-03-15 RX ADMIN — SODIUM CHLORIDE 1.5 ML/KG/HR: 9 INJECTION, SOLUTION INTRAVENOUS at 13:28

## 2024-03-15 RX ADMIN — LIDOCAINE HYDROCHLORIDE 4 ML: 40 SOLUTION TOPICAL at 07:55

## 2024-03-15 RX ADMIN — HEPARIN SODIUM 2000 UNITS: 200 INJECTION, SOLUTION INTRAVENOUS at 07:38

## 2024-03-15 RX ADMIN — APIXABAN 5 MG: 5 TABLET, FILM COATED ORAL at 10:32

## 2024-03-15 RX ADMIN — APIXABAN 5 MG: 5 TABLET, FILM COATED ORAL at 18:42

## 2024-03-15 RX ADMIN — LISINOPRIL 10 MG: 10 TABLET ORAL at 13:27

## 2024-03-15 RX ADMIN — PROPOFOL 200 MG: 10 INJECTION, EMULSION INTRAVENOUS at 07:54

## 2024-03-15 ASSESSMENT — COGNITIVE AND FUNCTIONAL STATUS - GENERAL
SUGGESTED CMS G CODE MODIFIER DAILY ACTIVITY: CH
MOBILITY SCORE: 24
SUGGESTED CMS G CODE MODIFIER MOBILITY: CH
DAILY ACTIVITIY SCORE: 24

## 2024-03-15 ASSESSMENT — LIFESTYLE VARIABLES
CONSUMPTION TOTAL: NEGATIVE
AVERAGE NUMBER OF DAYS PER WEEK YOU HAVE A DRINK CONTAINING ALCOHOL: 0
HAVE PEOPLE ANNOYED YOU BY CRITICIZING YOUR DRINKING: NO
ALCOHOL_USE: NO
ON A TYPICAL DAY WHEN YOU DRINK ALCOHOL HOW MANY DRINKS DO YOU HAVE: 0
TOTAL SCORE: 0
HOW MANY TIMES IN THE PAST YEAR HAVE YOU HAD 5 OR MORE DRINKS IN A DAY: 0
EVER HAD A DRINK FIRST THING IN THE MORNING TO STEADY YOUR NERVES TO GET RID OF A HANGOVER: NO
TOTAL SCORE: 0
TOTAL SCORE: 0
EVER FELT BAD OR GUILTY ABOUT YOUR DRINKING: NO
DOES PATIENT WANT TO STOP DRINKING: NO
HAVE YOU EVER FELT YOU SHOULD CUT DOWN ON YOUR DRINKING: NO

## 2024-03-15 ASSESSMENT — PATIENT HEALTH QUESTIONNAIRE - PHQ9
1. LITTLE INTEREST OR PLEASURE IN DOING THINGS: NOT AT ALL
SUM OF ALL RESPONSES TO PHQ9 QUESTIONS 1 AND 2: 0
2. FEELING DOWN, DEPRESSED, IRRITABLE, OR HOPELESS: NOT AT ALL

## 2024-03-15 ASSESSMENT — FIBROSIS 4 INDEX
FIB4 SCORE: 1.3

## 2024-03-15 ASSESSMENT — PAIN DESCRIPTION - PAIN TYPE: TYPE: SURGICAL PAIN

## 2024-03-15 NOTE — ANESTHESIA POSTPROCEDURE EVALUATION
Patient: Jayjay Anders    Procedure Summary       Date: 03/15/24 Room / Location: [facility] Imaging - Cath Lab [facility]    Anesthesia Start: 0750 Anesthesia Stop: 0955    Procedure: CL-PATENT FORAMEN OVALE CLOSURE Diagnosis:       PFO with atrial septal aneurysm      Patent foramen ovale      (See Associated Dx)    Scheduled Providers: Eduardo Evans M.D.; Cole Connolly M.D. Responsible Provider: Cole Connolly M.D.    Anesthesia Type: general ASA Status: 3            Final Anesthesia Type: general  Last vitals  BP   Blood Pressure: (!) 144/90    Temp   36.1 °C (97 °F)    Pulse   (!) 54   Resp   18    SpO2   100 %      Anesthesia Post Evaluation    Patient location during evaluation: PACU  Patient participation: complete - patient participated  Level of consciousness: awake and alert    Airway patency: patent  Anesthetic complications: no  Cardiovascular status: hemodynamically stable  Respiratory status: face mask    PONV: none          No notable events documented.     Nurse Pain Score: 2 (NPRS)

## 2024-03-15 NOTE — PROCEDURES
Patent foramen ovale closure    Indication: Paradoxical embolism with severely aneurysmal septum and patent foramen ovale    Procedures performed:  1. Successful closure of PFO using 44 mm Liberty ASD occluder device   2. Transesophageal echocardiogram    Procedure in detail:  Patient was explained risks and benefits of PFO closure.  Patient was brought to cardiac catheterization laboratory.  Both groins were prepped and patient was draped in sterile fashion.  Timeout was performed.  Right femoral vein access was obtained with 14 fr sheath after pre-closing with perclose.  Patient was given heparin and therapeutic ACT was confirmed. Multiplanar imaging was performed using transesophageal echocardiogram and confirmed patent foramen ovale. Patient atrial septum is very aneurysmal and complex. Then we used 4 Welsh multipurpose catheter and crossed PFO with 0.35 J-wire and placed in left upper pulmonary vein.  This wire was exchanged with stiff Amplatz wire and multipurpose catheter was removed. Aortic and SVC rims were measured after crossing with stiff wire to make device size selection.  A 44 mm Liberty ASD device was prepped in usual fashion.  Delivery system was advanced over an Amplatz stiff wire, then wire was removed.  Device was deployed in usual fashion, left disc on the left side, right disc on the right side.  Imaging was performed and confirmed adequate capture of rims and no residual flow.  Then a tug test was performed to confirm stability of the device. Device was stable during tug test.  Then device was released in usual fashion.  After releasing imaging was performed and confirmed good positioning.  All catheters and sheaths were removed.  Hemostasis was achieved with perclose suture.    Complications: None  Specimens: None  Estimated blood loss <20 cc.      Eduardo Evans  Interventional cardiologist.  [facility] of heart and vascular medicine.

## 2024-03-15 NOTE — ANESTHESIA POSTPROCEDURE EVALUATION
Patient: Jayjay Anders    Procedure Summary       Date: 03/15/24 Room / Location: [facility] Imaging - Cath Lab [facility]    Anesthesia Start: 0750 Anesthesia Stop: 0955    Procedure: CL-PATENT FORAMEN OVALE CLOSURE Diagnosis:       PFO with atrial septal aneurysm      Patent foramen ovale      (See Associated Dx)    Scheduled Providers: Eduardo Evans M.D.; Cole Connolly M.D. Responsible Provider: Cole Connolly M.D.    Anesthesia Type: general ASA Status: 3            Final Anesthesia Type: general  Last vitals  BP   Blood Pressure: (!) 116/93    Temp   36.1 °C (97 °F)    Pulse   (!) 54   Resp   12    SpO2   100 %      Anesthesia Post Evaluation    Patient location during evaluation: PACU  Patient participation: complete - patient participated  Level of consciousness: awake and alert    Airway patency: patent  Anesthetic complications: no  Cardiovascular status: hemodynamically stable  Respiratory status: face mask    PONV: none          No notable events documented.     Nurse Pain Score: 0 (NPRS)

## 2024-03-15 NOTE — OR NURSING
1000.Patient arrived from cath lab in stable condition. Received report from cath lab nurse and anesthesiologist. VSS. Dressing is CDI. Right groin access has old drainage, no new drainage noted. Pulses +2.     1045 Wife Marva updated on patient condition. All questions and concerns were addressed.    1117 Report given to Fanny ESTES.     1130.Patient transported to floor via gurney with monitor and belongings. RN notified that pt is on the way to the floor.  Family updated.

## 2024-03-15 NOTE — ANESTHESIA PROCEDURE NOTES
Airway    Date/Time: 3/15/2024 7:55 AM    Performed by: Cole Connolly M.D.  Authorized by: Cole Connolly M.D.    Location:  OR  Urgency:  Elective  Difficult Airway: No    Indications for Airway Management:  Anesthesia      Spontaneous Ventilation: absent    Sedation Level:  Deep  Preoxygenated: Yes    Patient Position:  Sniffing  Mask Difficulty Assessment:  1 - vent by mask  Final Airway Type:  Endotracheal airway  Final Endotracheal Airway:  ETT  Cuffed: Yes    Technique Used for Successful ETT Placement:  Direct laryngoscopy    Insertion Site:  Oral  Blade Type:  Venkat  Laryngoscope Blade/Videolaryngoscope Blade Size:  3  ETT Size (mm):  7.0  Measured from:  Teeth  ETT to Teeth (cm):  23  Placement Verified by: auscultation and capnometry    Cormack-Lehane Classification:  Grade I - full view of glottis  Number of Attempts at Approach:  1

## 2024-03-15 NOTE — ANESTHESIA TIME REPORT
Anesthesia Start and Stop Event Times       Date Time Event    3/15/2024 0750 Ready for Procedure     0750 Anesthesia Start     0955 Anesthesia Stop          Responsible Staff  03/15/24      Name Role Begin End    Cole Connolly M.D. Anesth 0750 0955          Overtime Reason:  no overtime (within assigned shift)    Comments:

## 2024-03-15 NOTE — PROGRESS NOTES
4 Eyes Skin Assessment Completed by ignacia alvarez RN and hermelindo hallman RN.    Head WDL  Ears WDL  Nose WDL  Mouth WDL  Neck WDL  Breast/Chest WDL  Shoulder Blades WDL  Spine WDL  (R) Arm/Elbow/Hand scar- right forearm. Incision- right wrist   (L) Arm/Elbow/Hand WDL  Abdomen WDL  Groin Incision  Scrotum/Coccyx/Buttocks Redness and Blanching  (R) Leg WDL  (L) Leg Scar- left knee  (R) Heel/Foot/Toe Swelling  (L) Heel/Foot/Toe Swelling          Devices In Places Tele Box, Pulse Ox, and Condom Cath      Interventions In Place N/A    Possible Skin Injury No    Pictures Uploaded Into Epic N/A  Wound Consult Placed N/A  RN Wound Prevention Protocol Ordered No

## 2024-03-15 NOTE — PROGRESS NOTES
Pt has been supine (flat on back) x4 hours at 14:00/ At 14:30, pt elevated HOB to eat. Will continue to monitor

## 2024-03-15 NOTE — ANESTHESIA PREPROCEDURE EVALUATION
" Date/Time: 03/15/24 0730    Scheduled providers: Eduardo Evans M.D.; Cole Connolly M.D.    Procedure: CL-PATENT FORAMEN OVALE CLOSURE    Diagnosis:       PFO with atrial septal aneurysm [Q21.12, I25.3]      Patent foramen ovale [Q21.12]    Indications: See Associated Dx    Location: [facility] Imaging - Cath Lab - [facility]            Relevant Problems   ANESTHESIA   (positive) Sleep apnea      CARDIAC   (positive) Bilateral pulmonary embolism (HCC)   (positive) HTN (hypertension)   (positive) Left leg DVT (HCC)     BP (!) 144/90   Pulse (!) 54   Temp 36.1 °C (97 °F) (Temporal)   Resp 18   Ht 1.854 m (6' 1\")   Wt 118 kg (259 lb 0.7 oz)   SpO2 96%   BMI 34.18 kg/m²     Physical Exam    Airway   Mallampati: II  TM distance: >3 FB  Neck ROM: full       Cardiovascular - normal exam  Rhythm: regular  Rate: normal  (-) murmur     Dental - normal exam           Pulmonary - normal exam  Breath sounds clear to auscultation     Abdominal    Neurological - normal exam                   Anesthesia Plan    ASA 3       Plan - general       Airway plan will be ETT  DOMINIC Planned        Induction: intravenous    Postoperative Plan: Postoperative administration of opioids is intended.    Pertinent diagnostic labs and testing reviewed    Informed Consent:    Anesthetic plan and risks discussed with patient.    Use of blood products discussed with: patient whom consented to blood products.           "

## 2024-03-15 NOTE — ANESTHESIA PROCEDURE NOTES
DOMINIC    Date/Time: 3/15/2024 8:12 AM    Performed by: Cole Connolly M.D.  Authorized by: Cole Connolly M.D.    Start Time:3/15/2024 8:12 AM  Preanesthetic Checklist: patient identified, IV checked, site marked, risks and benefits discussed, surgical consent, monitors and equipment checked, pre-op evaluation and timeout performed    Indication for DOMINIC: diagnostic   Patient Location: OR  Intubated: Yes  Bite Block: Yes  Heart Visualized: Yes  Insertion: atraumatic    **See FULL DOMINIC report in patient's chart via [facility]**

## 2024-03-16 ENCOUNTER — APPOINTMENT (OUTPATIENT)
Dept: CARDIOLOGY | Facility: MEDICAL CENTER | Age: 59
End: 2024-03-16
Attending: INTERNAL MEDICINE
Payer: COMMERCIAL

## 2024-03-16 ENCOUNTER — APPOINTMENT (OUTPATIENT)
Dept: RADIOLOGY | Facility: MEDICAL CENTER | Age: 59
End: 2024-03-16
Attending: INTERNAL MEDICINE
Payer: COMMERCIAL

## 2024-03-16 VITALS
HEART RATE: 89 BPM | DIASTOLIC BLOOD PRESSURE: 62 MMHG | HEIGHT: 73 IN | WEIGHT: 259.26 LBS | TEMPERATURE: 98.2 F | BODY MASS INDEX: 34.36 KG/M2 | OXYGEN SATURATION: 96 % | SYSTOLIC BLOOD PRESSURE: 125 MMHG | RESPIRATION RATE: 18 BRPM

## 2024-03-16 LAB
ANION GAP SERPL CALC-SCNC: 15 MMOL/L (ref 7–16)
BUN SERPL-MCNC: 15 MG/DL (ref 8–22)
CALCIUM SERPL-MCNC: 8.8 MG/DL (ref 8.5–10.5)
CHLORIDE SERPL-SCNC: 106 MMOL/L (ref 96–112)
CO2 SERPL-SCNC: 17 MMOL/L (ref 20–33)
CREAT SERPL-MCNC: 0.78 MG/DL (ref 0.5–1.4)
GFR SERPLBLD CREATININE-BSD FMLA CKD-EPI: 103 ML/MIN/1.73 M 2
GLUCOSE SERPL-MCNC: 166 MG/DL (ref 65–99)
POTASSIUM SERPL-SCNC: 4.2 MMOL/L (ref 3.6–5.5)
SODIUM SERPL-SCNC: 138 MMOL/L (ref 135–145)

## 2024-03-16 PROCEDURE — G0378 HOSPITAL OBSERVATION PER HR: HCPCS

## 2024-03-16 PROCEDURE — 36415 COLL VENOUS BLD VENIPUNCTURE: CPT

## 2024-03-16 PROCEDURE — 700102 HCHG RX REV CODE 250 W/ 637 OVERRIDE(OP): Performed by: INTERNAL MEDICINE

## 2024-03-16 PROCEDURE — 93325 DOPPLER ECHO COLOR FLOW MAPG: CPT

## 2024-03-16 PROCEDURE — 71045 X-RAY EXAM CHEST 1 VIEW: CPT

## 2024-03-16 PROCEDURE — 93325 DOPPLER ECHO COLOR FLOW MAPG: CPT | Mod: 26 | Performed by: INTERNAL MEDICINE

## 2024-03-16 PROCEDURE — 93308 TTE F-UP OR LMTD: CPT | Mod: 26 | Performed by: INTERNAL MEDICINE

## 2024-03-16 PROCEDURE — 80048 BASIC METABOLIC PNL TOTAL CA: CPT

## 2024-03-16 PROCEDURE — A9270 NON-COVERED ITEM OR SERVICE: HCPCS | Performed by: INTERNAL MEDICINE

## 2024-03-16 RX ORDER — ASPIRIN 81 MG/1
81 TABLET ORAL DAILY
Qty: 100 TABLET | Refills: 3 | Status: SHIPPED | OUTPATIENT
Start: 2024-03-17

## 2024-03-16 RX ADMIN — LISINOPRIL 10 MG: 10 TABLET ORAL at 05:07

## 2024-03-16 RX ADMIN — HYDROCHLOROTHIAZIDE 12.5 MG: 25 TABLET ORAL at 05:08

## 2024-03-16 RX ADMIN — ASPIRIN 81 MG: 81 TABLET, COATED ORAL at 05:07

## 2024-03-16 RX ADMIN — APIXABAN 5 MG: 5 TABLET, FILM COATED ORAL at 05:07

## 2024-03-16 ASSESSMENT — FIBROSIS 4 INDEX: FIB4 SCORE: 1.3

## 2024-03-16 NOTE — DISCHARGE INSTRUCTIONS
1.  No lifting more than 10 pounds for 1 week.  2.  Avoid heavy lifting, heavy activities like hiking for a month.  3.  Avoid dental cleaning for 3 months if possible.  Antibiotic prophylaxis is required prior to dental cleaning for about 1 year after the device closure.  4.  Bruising at access site is common, if there is a bleeding hold firm pressure for about 10-15 minutes, if bleeding does not stop seek medical attention  5.  Palpitations after PFO/ASD closure is common,get better in about a month's  6.  Please call our office/[employer] message if any concerns or questions.

## 2024-03-16 NOTE — CARE PLAN
Problem: Knowledge Deficit - Standard  Goal: Patient and family/care givers will demonstrate understanding of plan of care, disease process/condition, diagnostic tests and medications  Outcome: Progressing   The patient is Stable - Low risk of patient condition declining or worsening    Shift Goals  Clinical Goals: vitals wnl, comfort, rest  Patient Goals: rest  Family Goals: mary    Progress made toward(s) clinical / shift goals:      Patient is not progressing towards the following goals:

## 2024-03-16 NOTE — PROGRESS NOTES
Assumed care of pt. Report received.  Pt A/Ox4.  Pt denies pain.  Respirations even and unlabored.  Pt without needs at this time.  Call light in reach.  Bed in locked and low position.

## 2024-03-16 NOTE — PROGRESS NOTES
Monitor Summary  Rhythm: SR  Rate: 61-71  Ectopy: rare PVC  Measurements: 0.16/0.08/0.43            ---12 hr Chart Review---

## 2024-03-16 NOTE — DISCHARGE SUMMARY
CHIEF COMPLAINT ON ADMISSION  Paradoxical embolism with severely aneurysmal septum and patent foramen ovale     CODE STATUS  Full    HPI & HOSPITAL COURSE  This is a 58 years old male here for Paradoxical embolism with severely aneurysmal septum and patent foramen ovale, elective PFO closure.    Significant medical history of DVT, PE and paradoxical emboli to arm needing thrombectomy. Patient underwent successful closure of PFO using 44 mm Mack ASD occluder device.    The patient recovered well with no complications. Their right femoral vein site was clean, dry, and intact with no signs of hematoma, bleeding, or infection. CXR showed No acute cardiopulmonary disease.  and ECHO showed No evidence of residual right to left shunt by agitated saline study.     The patient understands discharge instructions related to lifting precautions for one week. The patient was given thorough discussion of his discharge instructions per nursing and myself. Asa and OAC adherence were discussed in detail. Patient was discharged to home with family with no further questions/concerns, their outpatient follow up has been made by our office.      Discharge instruction   1.  No lifting more than 10 pounds for 1 week.  2.  Avoid heavy lifting, heavy activities like hiking for a month.  3.  Avoid dental cleaning for 3 months if possible.  Antibiotic prophylaxis is required prior to dental cleaning for about 1 year after the device closure.  4.  Bruising at access site is common, if there is a bleeding hold firm pressure for about 10-15 minutes, if bleeding does not stop seek medical attention  5.  Palpitations after PFO/ASD closure is common,get better in about a month's  6.  Please call our office/[employer] message if any concerns or questions.    MEDICATIONS ON DISCHARGE     Medication List        START taking these medications        Instructions   aspirin 81 MG EC tablet  Start taking on: March 17, 2024   Take 1 Tablet by mouth every  day.  Dose: 81 mg            CONTINUE taking these medications        Instructions   apixaban 5mg Tabs  Commonly known as: Eliquis   Take 1 Tablet by mouth 2 times a day. Indications: DVT/PE  Dose: 5 mg     lisinopril-hydrochlorothiazide 10-12.5 MG per tablet  Commonly known as: Prinzide   Take 1 Tablet by mouth every morning.  Dose: 1 Tablet     travoprost 0.004 % Soln  Commonly known as: Travatan Z   Administer 1 Drop into both eyes every day.  Dose: 1 Drop                PROCEDURES  3/15/2024  Procedures performed:  1. Successful closure of PFO using 44 mm Ogden ASD occluder device   2. Transesophageal echocardiogram    LABORATORY  Lab Results   Component Value Date/Time    SODIUM 136 02/14/2018 03:29 AM    POTASSIUM 4.2 02/14/2018 03:29 AM    CHLORIDE 106 02/14/2018 03:29 AM    CO2 22 02/14/2018 03:29 AM    GLUCOSE 95 02/14/2018 03:29 AM    BUN 16 02/14/2018 03:29 AM    CREATININE 1.02 02/14/2018 03:29 AM        Lab Results   Component Value Date/Time    WBC 8.0 02/14/2018 03:29 AM    HEMOGLOBIN 15.0 02/14/2018 03:29 AM    HEMATOCRIT 43.1 02/14/2018 03:29 AM    PLATELETCT 247 02/14/2018 03:29 AM        Future Appointments   Date Time Provider Department Center   4/24/2024  9:00 AM Eduardo Evans M.D. [facility] None

## 2024-03-28 ENCOUNTER — HOSPITAL ENCOUNTER (OUTPATIENT)
Dept: LAB | Facility: MEDICAL CENTER | Age: 59
End: 2024-03-28
Attending: FAMILY MEDICINE
Payer: COMMERCIAL

## 2024-03-28 PROCEDURE — 81240 F2 GENE: CPT

## 2024-03-28 PROCEDURE — 81241 F5 GENE: CPT

## 2024-03-28 PROCEDURE — 36415 COLL VENOUS BLD VENIPUNCTURE: CPT

## 2024-04-01 LAB
F2 C.20210G>A GENO BLD/T: NEGATIVE
F5 P.R506Q BLD/T QL: NEGATIVE

## 2024-04-02 ENCOUNTER — TELEPHONE (OUTPATIENT)
Dept: CARDIOLOGY | Facility: MEDICAL CENTER | Age: 59
End: 2024-04-02
Payer: COMMERCIAL

## 2024-09-03 ENCOUNTER — APPOINTMENT (RX ONLY)
Dept: URBAN - METROPOLITAN AREA CLINIC 4 | Facility: CLINIC | Age: 59
Setting detail: DERMATOLOGY
End: 2024-09-03

## 2024-09-03 DIAGNOSIS — Z85.828 PERSONAL HISTORY OF OTHER MALIGNANT NEOPLASM OF SKIN: ICD-10-CM | Status: STABLE

## 2024-09-03 DIAGNOSIS — Z85.820 PERSONAL HISTORY OF MALIGNANT MELANOMA OF SKIN: ICD-10-CM | Status: STABLE

## 2024-09-03 PROBLEM — D48.5 NEOPLASM OF UNCERTAIN BEHAVIOR OF SKIN: Status: ACTIVE | Noted: 2024-09-03

## 2024-09-03 PROCEDURE — 11102 TANGNTL BX SKIN SINGLE LES: CPT

## 2024-09-03 PROCEDURE — ? OBSERVATION

## 2024-09-03 PROCEDURE — ? BIOPSY BY SHAVE METHOD

## 2024-09-03 PROCEDURE — ? DIAGNOSIS COMMENT

## 2024-09-03 PROCEDURE — 99213 OFFICE O/P EST LOW 20 MIN: CPT | Mod: 25

## 2024-09-03 PROCEDURE — ? COUNSELING

## 2024-09-03 ASSESSMENT — LOCATION SIMPLE DESCRIPTION DERM
LOCATION SIMPLE: LEFT SHOULDER
LOCATION SIMPLE: LEFT PRETIBIAL REGION

## 2024-09-03 ASSESSMENT — LOCATION ZONE DERM
LOCATION ZONE: LEG
LOCATION ZONE: ARM

## 2024-09-03 ASSESSMENT — LOCATION DETAILED DESCRIPTION DERM
LOCATION DETAILED: LEFT LATERAL PROXIMAL PRETIBIAL REGION
LOCATION DETAILED: LEFT POSTERIOR SHOULDER

## 2024-09-03 NOTE — PROCEDURE: COUNSELING
When Should The Patient Follow-Up For Their Next Full-Body Skin Exam?: 6 Months
Quality 137: Melanoma: Continuity Of Care - Recall System: Patient information entered into a recall system that includes: target date for the next exam specified AND a process to follow up with patients regarding missed or unscheduled appointments
Detail Level: Detailed
Patient Specific Counseling (Will Not Stick From Patient To Patient): Discussed with Eric that these additional lesions may represent superficial basal cell carcinomas or lichenoid keratoses.  He elects to biopsy one lesion (above).  If results reveal benign lichenoid keratosis we will observe all lesions for now and biopsy whatever persists at his f/u appt.  If bx reveals BCC we may obtain additional biopsies and treat with imiquimod.

## 2024-09-03 NOTE — PROCEDURE: DIAGNOSIS COMMENT
Detail Level: Simple
Comment: Appears to have faded slightly since last visit.
Render Risk Assessment In Note?: no

## 2024-09-06 ENCOUNTER — RX ONLY (OUTPATIENT)
Age: 59
Setting detail: RX ONLY
End: 2024-09-06

## 2024-09-06 RX ORDER — IMIQUIMOD 12.5 MG/.25G
CREAM TOPICAL
Qty: 12 | Refills: 0 | Status: ERX | COMMUNITY
Start: 2024-09-06

## 2024-09-25 ENCOUNTER — TELEPHONE (OUTPATIENT)
Dept: CARDIOLOGY | Facility: MEDICAL CENTER | Age: 59
End: 2024-09-25
Payer: COMMERCIAL

## 2024-09-25 NOTE — LETTER
Dear Dentistry,       Thank you for your interest in coordinating the cardiovascular care of our mutual patient Jayjay Anders 1965.  We have reviewed their [facility] records; and to the best of our understanding our patient has not had stenting, ablation, cardiothoracic surgery or hospitalization for cardiovascular reasons in the past 6 months.  Jayjay Anders has seen us within the past 18 months and are having a low-risk procedure or surgery. They are considered to have nonmodifiable risk and may proceed with the proposed procedure or surgery.  The patient MAY NOT hold their antiplatelet or anticoagulation.  If they have an artificial heart valve or meet other American Heart Association/American Dental Association guidelines, please prescribe antibiotics accordingly. Antibiotic prophylaxis is recommended for dental procedures that involve manipulation of the gingival tissue (gums) or the periapical region (root tip) of teeth or perforation of the oral mucosa.     Antibiotic prophylaxis is NOT recommended for the following procedures Routine anesthetic injections through non-infected tissue  Taking dental radiographs  Placement of removable prosthodontic or orthodontic appliances  Adjustment of orthodontic appliances  Placement of orthodontic brackets  Shedding of deciduous teeth (baby teeth)  Bleeding from trauma to the lips or oral mucosa   If you have specific concerns for continuing antiplatelets/anticoagulation in the periprocedural period, please reach out to us at [phone].       Thank you,       [facility]

## 2024-09-25 NOTE — TELEPHONE ENCOUNTER
AK    Caller: Marva Anders     Topic/issue: Patient will be having a dental implant placed and now that he's not on blood thinners, the dental office needs a written confirmation that he's cleared for surgery.     Procedure date: 10/10/2024    Name: [facility]   P: [phone]  F: [phone]    Callback Number: [phone]    Thank you,  Shawna CUMMINGS

## 2024-09-25 NOTE — TELEPHONE ENCOUNTER
Last OV: 02/29/2024  Proposed Surgery: Dental implants   Surgery Date: 10/10/2024  Requesting Office Name: [facility]   Fax Number: [phone]   Preference of Location (default is surgery center unless specified by Cardiologist or [facility]AR[facility])  Prior Clearance Addressed: No      Anticoags/Antiplatelets: Aspirin and Apixaban   Anticoags/Antiplatelet managed by Cardiology? YES    Outstanding Cardiac Imaging : No  Stent, Cardiac Devices, or Catheterization: Yes  Date : 03/15/2024   Ablation, TAVR/Valve (including open heart), Cardioversion: No  Recent Cardiac Hospitalization: Yes  Date:  03/15/2024            When: Greater than 3 months since hospitalization   History (cardiac history):   Past Medical History:   Diagnosis Date    Cancer (HCC) 09/22/2022    melanoma    Glaucoma     History of pulmonary embolus (PE)     Hypertension     Melanoma of back (HCC) 09/26/2022    Pain     left knee    Sleep apnea     Snoring              Surgical Clearance Letter Sent: YES   **Scan clearance request letter into [facility].**

## 2024-10-18 ENCOUNTER — PATIENT MESSAGE (OUTPATIENT)
Dept: CARDIOLOGY | Facility: MEDICAL CENTER | Age: 59
End: 2024-10-18
Payer: COMMERCIAL

## 2024-10-18 DIAGNOSIS — Q21.12 PFO WITH ATRIAL SEPTAL ANEURYSM: ICD-10-CM

## 2024-10-18 DIAGNOSIS — I25.3 PFO WITH ATRIAL SEPTAL ANEURYSM: ICD-10-CM

## 2024-12-12 ENCOUNTER — PHARMACY VISIT (OUTPATIENT)
Dept: PHARMACY | Facility: MEDICAL CENTER | Age: 59
End: 2024-12-12
Payer: COMMERCIAL

## 2024-12-12 ENCOUNTER — APPOINTMENT (OUTPATIENT)
Dept: RADIOLOGY | Facility: MEDICAL CENTER | Age: 59
End: 2024-12-12
Attending: EMERGENCY MEDICINE
Payer: COMMERCIAL

## 2024-12-12 ENCOUNTER — HOSPITAL ENCOUNTER (EMERGENCY)
Facility: MEDICAL CENTER | Age: 59
End: 2024-12-12
Attending: EMERGENCY MEDICINE
Payer: COMMERCIAL

## 2024-12-12 VITALS
DIASTOLIC BLOOD PRESSURE: 91 MMHG | TEMPERATURE: 98 F | BODY MASS INDEX: 34.19 KG/M2 | RESPIRATION RATE: 16 BRPM | SYSTOLIC BLOOD PRESSURE: 170 MMHG | OXYGEN SATURATION: 96 % | HEIGHT: 73 IN | WEIGHT: 257.94 LBS | HEART RATE: 74 BPM

## 2024-12-12 DIAGNOSIS — R06.00 DYSPNEA, UNSPECIFIED TYPE: ICD-10-CM

## 2024-12-12 DIAGNOSIS — N30.91 HEMORRHAGIC CYSTITIS: ICD-10-CM

## 2024-12-12 LAB
ALBUMIN SERPL BCP-MCNC: 4 G/DL (ref 3.2–4.9)
ALBUMIN/GLOB SERPL: 1.2 G/DL
ALP SERPL-CCNC: 65 U/L (ref 30–99)
ALT SERPL-CCNC: 24 U/L (ref 2–50)
ANION GAP SERPL CALC-SCNC: 13 MMOL/L (ref 7–16)
APPEARANCE UR: ABNORMAL
AST SERPL-CCNC: 29 U/L (ref 12–45)
BACTERIA #/AREA URNS HPF: ABNORMAL /HPF
BASOPHILS # BLD AUTO: 0 % (ref 0–1.8)
BASOPHILS # BLD: 0 K/UL (ref 0–0.12)
BILIRUB SERPL-MCNC: 1.2 MG/DL (ref 0.1–1.5)
BILIRUB UR QL STRIP.AUTO: NEGATIVE
BUN SERPL-MCNC: 12 MG/DL (ref 8–22)
CALCIUM ALBUM COR SERPL-MCNC: 9.1 MG/DL (ref 8.5–10.5)
CALCIUM SERPL-MCNC: 9.1 MG/DL (ref 8.5–10.5)
CASTS URNS QL MICRO: ABNORMAL /LPF (ref 0–2)
CHLORIDE SERPL-SCNC: 100 MMOL/L (ref 96–112)
CO2 SERPL-SCNC: 21 MMOL/L (ref 20–33)
COLOR UR: ABNORMAL
CREAT SERPL-MCNC: 1.09 MG/DL (ref 0.5–1.4)
EKG IMPRESSION: NORMAL
EOSINOPHIL # BLD AUTO: 0 K/UL (ref 0–0.51)
EOSINOPHIL NFR BLD: 0 % (ref 0–6.9)
EPITHELIAL CELLS 1715: ABNORMAL /HPF (ref 0–5)
EPITHELIAL CELLS RENAL  1715R: PRESENT /HPF
ERYTHROCYTE [DISTWIDTH] IN BLOOD BY AUTOMATED COUNT: 39.6 FL (ref 35.9–50)
FLUAV RNA SPEC QL NAA+PROBE: NEGATIVE
FLUBV RNA SPEC QL NAA+PROBE: NEGATIVE
GFR SERPLBLD CREATININE-BSD FMLA CKD-EPI: 78 ML/MIN/1.73 M 2
GLOBULIN SER CALC-MCNC: 3.4 G/DL (ref 1.9–3.5)
GLUCOSE SERPL-MCNC: 145 MG/DL (ref 65–99)
GLUCOSE UR STRIP.AUTO-MCNC: NEGATIVE MG/DL
HCT VFR BLD AUTO: 41.1 % (ref 42–52)
HGB BLD-MCNC: 14.6 G/DL (ref 14–18)
IMM GRANULOCYTES # BLD AUTO: 0.05 K/UL (ref 0–0.11)
IMM GRANULOCYTES NFR BLD AUTO: 0.5 % (ref 0–0.9)
KETONES UR STRIP.AUTO-MCNC: ABNORMAL MG/DL
LEUKOCYTE ESTERASE UR QL STRIP.AUTO: ABNORMAL
LYMPHOCYTES # BLD AUTO: 0.68 K/UL (ref 1–4.8)
LYMPHOCYTES NFR BLD: 6.2 % (ref 22–41)
MCH RBC QN AUTO: 31.5 PG (ref 27–33)
MCHC RBC AUTO-ENTMCNC: 35.5 G/DL (ref 32.3–36.5)
MCV RBC AUTO: 88.8 FL (ref 81.4–97.8)
MICRO URNS: ABNORMAL
MONOCYTES # BLD AUTO: 0.67 K/UL (ref 0–0.85)
MONOCYTES NFR BLD AUTO: 6.1 % (ref 0–13.4)
NEUTROPHILS # BLD AUTO: 9.55 K/UL (ref 1.82–7.42)
NEUTROPHILS NFR BLD: 87.2 % (ref 44–72)
NITRITE UR QL STRIP.AUTO: NEGATIVE
NRBC # BLD AUTO: 0 K/UL
NRBC BLD-RTO: 0 /100 WBC (ref 0–0.2)
NT-PROBNP SERPL IA-MCNC: 646 PG/ML (ref 0–125)
PH UR STRIP.AUTO: 7 [PH] (ref 5–8)
PLATELET # BLD AUTO: 184 K/UL (ref 164–446)
PMV BLD AUTO: 9.7 FL (ref 9–12.9)
POTASSIUM SERPL-SCNC: 3.7 MMOL/L (ref 3.6–5.5)
PROT SERPL-MCNC: 7.4 G/DL (ref 6–8.2)
PROT UR QL STRIP: 300 MG/DL
RBC # BLD AUTO: 4.63 M/UL (ref 4.7–6.1)
RBC # URNS HPF: ABNORMAL /HPF (ref 0–2)
RBC UR QL AUTO: ABNORMAL
RSV RNA SPEC QL NAA+PROBE: NEGATIVE
SARS-COV-2 RNA RESP QL NAA+PROBE: NOTDETECTED
SODIUM SERPL-SCNC: 134 MMOL/L (ref 135–145)
SP GR UR STRIP.AUTO: 1.03
TRANS CELLS URNS QL MICRO: PRESENT /HPF
TROPONIN T SERPL-MCNC: 13 NG/L (ref 6–19)
UROBILINOGEN UR STRIP.AUTO-MCNC: 2 EU/DL
WBC # BLD AUTO: 11 K/UL (ref 4.8–10.8)
WBC #/AREA URNS HPF: ABNORMAL /HPF

## 2024-12-12 PROCEDURE — 84484 ASSAY OF TROPONIN QUANT: CPT

## 2024-12-12 PROCEDURE — 99285 EMERGENCY DEPT VISIT HI MDM: CPT

## 2024-12-12 PROCEDURE — 85025 COMPLETE CBC W/AUTO DIFF WBC: CPT

## 2024-12-12 PROCEDURE — 71275 CT ANGIOGRAPHY CHEST: CPT

## 2024-12-12 PROCEDURE — 81001 URINALYSIS AUTO W/SCOPE: CPT

## 2024-12-12 PROCEDURE — 96365 THER/PROPH/DIAG IV INF INIT: CPT

## 2024-12-12 PROCEDURE — 93005 ELECTROCARDIOGRAM TRACING: CPT | Mod: TC

## 2024-12-12 PROCEDURE — 700117 HCHG RX CONTRAST REV CODE 255: Performed by: EMERGENCY MEDICINE

## 2024-12-12 PROCEDURE — 80053 COMPREHEN METABOLIC PANEL: CPT

## 2024-12-12 PROCEDURE — RXMED WILLOW AMBULATORY MEDICATION CHARGE: Performed by: EMERGENCY MEDICINE

## 2024-12-12 PROCEDURE — 36415 COLL VENOUS BLD VENIPUNCTURE: CPT

## 2024-12-12 PROCEDURE — 83880 ASSAY OF NATRIURETIC PEPTIDE: CPT

## 2024-12-12 PROCEDURE — 700111 HCHG RX REV CODE 636 W/ 250 OVERRIDE (IP): Performed by: EMERGENCY MEDICINE

## 2024-12-12 PROCEDURE — 93005 ELECTROCARDIOGRAM TRACING: CPT | Mod: TC | Performed by: EMERGENCY MEDICINE

## 2024-12-12 PROCEDURE — 0241U HCHG SARS-COV-2 COVID-19 NFCT DS RESP RNA 4 TRGT ED POC: CPT

## 2024-12-12 PROCEDURE — 700105 HCHG RX REV CODE 258: Performed by: EMERGENCY MEDICINE

## 2024-12-12 RX ORDER — CEPHALEXIN 500 MG/1
500 CAPSULE ORAL 4 TIMES DAILY
Qty: 28 CAPSULE | Refills: 0 | Status: ACTIVE | OUTPATIENT
Start: 2024-12-12 | End: 2024-12-19

## 2024-12-12 RX ADMIN — IOHEXOL 60 ML: 350 INJECTION, SOLUTION INTRAVENOUS at 08:25

## 2024-12-12 RX ADMIN — CEFAZOLIN 2 G: 2 INJECTION, POWDER, FOR SOLUTION INTRAMUSCULAR; INTRAVENOUS at 10:12

## 2024-12-12 ASSESSMENT — FIBROSIS 4 INDEX: FIB4 SCORE: 1.33

## 2024-12-12 NOTE — ED NOTES
PO[place] ([facility]) Cov-2, Flu A/B, RSV by PCR run on Vidcaster machine, results awaiting.

## 2024-12-12 NOTE — ED PROVIDER NOTES
ED Provider Note    CHIEF COMPLAINT  Chief Complaint   Patient presents with    Shortness of Breath     Since 1 day     History of PE    Abdominal Pain     Lower abdominal pain with dysuria since early morning    Painful Urination       HPI/GENO Ro Sabino Anders is a 59 y.o. male who presents with shortness of breath.  The patient states over the last couple of days he has not felt well.  He states has had some congestion as well as subjective fevers and chills.  He is developed progressive shortness of breath that does seem to be exertional.  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  This was closed via cardiology with mesh.  The patient states he felt similar at that time when he had the pulmonary embolus.  He has had some congestion.  He states he is also noticed some pain with urination this morning.  He has no history of urinary tract infections.    PAST MEDICAL HISTORY   has a past medical history of Cancer (HCC) (09/22/2022), Glaucoma, History of pulmonary embolus (PE), Hypertension, Melanoma of back (HCC) (09/26/2022), Pain, Sleep apnea, and Snoring.    SURGICAL HISTORY   has a past surgical history that includes appendectomy; wide excision, lesion, torso (Left, 09/26/2022); wound closure neuro (Left, 09/26/2022); open rx patella fx (Left, 01/30/2024); thrombectomy (Right, 02/06/2024); angiogram (02/06/2024); and other orthopedic surgery (2/8/2024).    FAMILY HISTORY  Family History   Problem Relation Age of Onset    Dementia Mother     Stroke Mother        SOCIAL HISTORY  Social History     Tobacco Use    Smoking status: Never     Passive exposure: Past    Smokeless tobacco: Never   Vaping Use    Vaping status: Never Used   Substance and Sexual Activity    Alcohol use: Not Currently     Comment: 2-3 BEERS A MONTH    Drug use: Never    Sexual activity: Not on file  "      CURRENT MEDICATIONS  Home Medications       Reviewed by Marie Gray R.N. (Registered Nurse) on 24 at 0641  Med List Status: Partial     Medication Last Dose Status   apixaban (ELIQUIS) 5mg Tab  Active   aspirin 81 MG EC tablet  Active   lisinopril-hydrochlorothiazide (PRINZIDE) 10-12.5 MG per tablet  Active   travoprost (TRAVATAN Z) 0.004 % Solution  Active                  Audit from Redirected Encounters    **Home medications have not yet been reviewed for this encounter**         ALLERGIES  No Known Allergies    PHYSICAL EXAM  VITAL SIGNS: BP (!) 179/86   Pulse 82   Temp 36.9 °C (98.4 °F) (Temporal)   Resp 16   Ht 1.854 m (6' 1\")   Wt 117 kg (257 lb 15 oz)   SpO2 96%   BMI 34.03 kg/m²    In general the patient appears ill but nontoxic    Ears tympanic membranes retracted but nonerythematous, nares have swollen turbinates, oropharynx nonerythematous    Pulmonary the patient's lungs are clear to auscultation bilaterally    Cardiovascular S1-S2 with a regular rate and rhythm    GI abdomen soft    Skin no pallor no jaundice    Extremities no distal edema    Neurologic examination is grossly intact    EKG/LABS  Results for orders placed or performed during the hospital encounter of 24   EKG    Collection Time: 24  6:43 AM   Result Value Ref Range    Report       [facility]    Test Date:  2024  Pt Name:    DIAZ CADENA             Department: ER  MRN:        [id number]                      Room:  Gender:     Male                         Technician: [id number]  :        1965                   Requested By:ER TRIAGE PROTOCOL  Order #:    [id number]                    Reading MD: JUANCHO MICHELLE MD    Measurements  Intervals                                Axis  Rate:       86                           P:          45  GA:         126                          QRS:        41  QRSD:       99                           T:          52  QT:       "   364  QTc:        436    Interpretive Statements  The patient was admitted for abdominal abscess status post surgery.  Twelve-lead EKG shows a normal sinus rhythm with a ventricular rate of 86,  ST segment depression in lead II and laterally but this is not acute.  He had  some T wave inversion in V2 and V3 and the T waves are now upright.  Overall  no acute evide nce of ischemia  Electronically Signed On 12- 08:36:18 PST by JUANCHO MICHELLE MD     CBC with Differential    Collection Time: 12/12/24  6:51 AM   Result Value Ref Range    WBC 11.0 (H) 4.8 - 10.8 K/uL    RBC 4.63 (L) 4.70 - 6.10 M/uL    Hemoglobin 14.6 14.0 - 18.0 g/dL    Hematocrit 41.1 (L) 42.0 - 52.0 %    MCV 88.8 81.4 - 97.8 fL    MCH 31.5 27.0 - 33.0 pg    MCHC 35.5 32.3 - 36.5 g/dL    RDW 39.6 35.9 - 50.0 fL    Platelet Count 184 164 - 446 K/uL    MPV 9.7 9.0 - 12.9 fL    Neutrophils-Polys 87.20 (H) 44.00 - 72.00 %    Lymphocytes 6.20 (L) 22.00 - 41.00 %    Monocytes 6.10 0.00 - 13.40 %    Eosinophils 0.00 0.00 - 6.90 %    Basophils 0.00 0.00 - 1.80 %    Immature Granulocytes 0.50 0.00 - 0.90 %    Nucleated RBC 0.00 0.00 - 0.20 /100 WBC    Neutrophils (Absolute) 9.55 (H) 1.82 - 7.42 K/uL    Lymphs (Absolute) 0.68 (L) 1.00 - 4.80 K/uL    Monos (Absolute) 0.67 0.00 - 0.85 K/uL    Eos (Absolute) 0.00 0.00 - 0.51 K/uL    Baso (Absolute) 0.00 0.00 - 0.12 K/uL    Immature Granulocytes (abs) 0.05 0.00 - 0.11 K/uL    NRBC (Absolute) 0.00 K/uL   Comp Metabolic Panel    Collection Time: 12/12/24  6:51 AM   Result Value Ref Range    Sodium 134 (L) 135 - 145 mmol/L    Potassium 3.7 3.6 - 5.5 mmol/L    Chloride 100 96 - 112 mmol/L    Co2 21 20 - 33 mmol/L    Anion Gap 13.0 7.0 - 16.0    Glucose 145 (H) 65 - 99 mg/dL    Bun 12 8 - 22 mg/dL    Creatinine 1.09 0.50 - 1.40 mg/dL    Calcium 9.1 8.5 - 10.5 mg/dL    Correct Calcium 9.1 8.5 - 10.5 mg/dL    AST(SGOT) 29 12 - 45 U/L    ALT(SGPT) 24 2 - 50 U/L    Alkaline Phosphatase 65 30 - 99 U/L    Total  Bilirubin 1.2 0.1 - 1.5 mg/dL    Albumin 4.0 3.2 - 4.9 g/dL    Total Protein 7.4 6.0 - 8.2 g/dL    Globulin 3.4 1.9 - 3.5 g/dL    A-G Ratio 1.2 g/dL   proBrain Natriuretic Peptide, NT    Collection Time: 12/12/24  6:51 AM   Result Value Ref Range    NT-proBNP 646 (H) 0 - 125 pg/mL   Troponin    Collection Time: 12/12/24  6:51 AM   Result Value Ref Range    Troponin T 13 6 - 19 ng/L   ESTIMATED GFR    Collection Time: 12/12/24  6:51 AM   Result Value Ref Range    GFR (CKD-EPI) 78 >60 mL/min/1.73 m 2   POC CoV-2, FLU A/B, RSV by PCR    Collection Time: 12/12/24  7:12 AM   Result Value Ref Range    POC Influenza A RNA, PCR Negative Negative    POC Influenza B RNA, PCR Negative Negative    POC RSV, by PCR Negative Negative    POC SARS-CoV-2, PCR NotDetected NotDetected   Urinalysis, Culture if Indicated    Collection Time: 12/12/24  8:35 AM    Specimen: Urine, Clean Catch   Result Value Ref Range    Color Dark Yellow     Character Cloudy (A)     Specific Gravity 1.030 <1.035    Ph 7.0 5.0 - 8.0    Glucose Negative Negative mg/dL    Ketones Trace (A) Negative mg/dL    Protein 300 (A) Negative mg/dL    Bilirubin Negative Negative    Urobilinogen, Urine 2.0 (A) <=1.0 EU/dL    Nitrite Negative Negative    Leukocyte Esterase Small (A) Negative    Occult Blood Large (A) Negative    Micro Urine Req Microscopic        I have independently interpreted this EKG    RADIOLOGY/PROCEDURES     Radiologist interpretation:  CT-CTA CHEST PULMONARY ARTERY W/ RECONS   Final Result      1.  No evidence of pulmonary embolism.   2.  No overt failure or pneumonia.                COURSE & MEDICAL DECISION MAKING    This is a 59-year-old gentleman who presents the emergency department with shortness of breath, abdominal pain, and dysuria.  As for the shortness of breath clinically has evidence of a viral process and I suspect he may have a slight viral pneumonitis.  He does have a history of a pulmonary bliss recently and is not on any further  anticoagulation therefore I did perform a CT scan of the chest to rule out a pulmonary embolus and this was negative.  Furthermore there is no evidence of pneumonia nor heart failure.  Clinically does not have any heart failure.  His BNP is slightly elevated but again clinically does not have any heart failure at this time.  Viral testing was negative for influenza, RSV, and COVID.  He has developed some dysuria and his urinalysis is consistent with a hemorrhagic cystitis.  Therefore the patient will receive Ancef.  Clinically does not appear toxic nor septic.  He is not acidotic.  The patient be discharged home on Keflex with instructions to stay well-hydrated.  He will take Tylenol as needed for fever and pain control.  The patient will return for persistent vomiting, increased pain, or difficulty initiating his stream of urine.    Of note the patient is not currently on anticoagulation.    FINAL DIAGNOSIS  1.  Dyspnea  2.  Viral upper respiratory infection  3.  Hemorrhagic cystitis    Disposition  The patient will be discharged in stable condition     Electronically signed by: Christo Santana M.D., 12/12/2024 7:00 AM

## 2024-12-12 NOTE — ED TRIAGE NOTES
Chief Complaint   Patient presents with    Shortness of Breath     Since 1 day     History of PE    Abdominal Pain     Lower abdominal pain with dysuria since early morning    Painful Urination     Pain: 2/10    Pt came in to triage ambulatory with steady gait for the above complaints.     Pt is alert and oriented x 4, speaking in full sentences, follows commands and responds appropriately to questions.     Respirations are even and unlabored.    Pt placed in lobby. Pt educated on triage process.     Pt encouraged to inform staff for any changes in condition or if needs help while waiting to be room in.    Vitals:    12/12/24 0635   BP: (!) 179/86   Pulse: 82   Resp: 16   Temp: 36.9 °C (98.4 °F)   SpO2: 96%

## 2024-12-12 NOTE — DISCHARGE INSTRUCTIONS
Stay well-hydrated.  Take Tylenol as needed for fever and pain control.  Recheck with your primary care doctor as scheduled.  Return if you are acutely worse.

## 2024-12-24 ENCOUNTER — HOSPITAL ENCOUNTER (OUTPATIENT)
Dept: LAB | Facility: MEDICAL CENTER | Age: 59
End: 2024-12-24
Attending: FAMILY MEDICINE
Payer: COMMERCIAL

## 2024-12-24 LAB
ALBUMIN SERPL BCP-MCNC: 4.4 G/DL (ref 3.2–4.9)
ALBUMIN/GLOB SERPL: 1.5 G/DL
ALP SERPL-CCNC: 66 U/L (ref 30–99)
ALT SERPL-CCNC: 26 U/L (ref 2–50)
ANION GAP SERPL CALC-SCNC: 9 MMOL/L (ref 7–16)
AST SERPL-CCNC: 23 U/L (ref 12–45)
BASOPHILS # BLD AUTO: 0.3 % (ref 0–1.8)
BASOPHILS # BLD: 0.02 K/UL (ref 0–0.12)
BILIRUB SERPL-MCNC: 0.7 MG/DL (ref 0.1–1.5)
BUN SERPL-MCNC: 19 MG/DL (ref 8–22)
CALCIUM ALBUM COR SERPL-MCNC: 9.2 MG/DL (ref 8.5–10.5)
CALCIUM SERPL-MCNC: 9.5 MG/DL (ref 8.5–10.5)
CHLORIDE SERPL-SCNC: 100 MMOL/L (ref 96–112)
CHOLEST SERPL-MCNC: 149 MG/DL (ref 100–199)
CO2 SERPL-SCNC: 25 MMOL/L (ref 20–33)
CREAT SERPL-MCNC: 1.18 MG/DL (ref 0.5–1.4)
EOSINOPHIL # BLD AUTO: 0.2 K/UL (ref 0–0.51)
EOSINOPHIL NFR BLD: 3 % (ref 0–6.9)
ERYTHROCYTE [DISTWIDTH] IN BLOOD BY AUTOMATED COUNT: 42.6 FL (ref 35.9–50)
EST. AVERAGE GLUCOSE BLD GHB EST-MCNC: 117 MG/DL
FASTING STATUS PATIENT QL REPORTED: NORMAL
GFR SERPLBLD CREATININE-BSD FMLA CKD-EPI: 71 ML/MIN/1.73 M 2
GLOBULIN SER CALC-MCNC: 3 G/DL (ref 1.9–3.5)
GLUCOSE SERPL-MCNC: 97 MG/DL (ref 65–99)
HBA1C MFR BLD: 5.7 % (ref 4–5.6)
HCT VFR BLD AUTO: 43.8 % (ref 42–52)
HDLC SERPL-MCNC: 46 MG/DL
HGB BLD-MCNC: 14.5 G/DL (ref 14–18)
IMM GRANULOCYTES # BLD AUTO: 0.02 K/UL (ref 0–0.11)
IMM GRANULOCYTES NFR BLD AUTO: 0.3 % (ref 0–0.9)
LDLC SERPL CALC-MCNC: 86 MG/DL
LYMPHOCYTES # BLD AUTO: 2.08 K/UL (ref 1–4.8)
LYMPHOCYTES NFR BLD: 31.5 % (ref 22–41)
MCH RBC QN AUTO: 30.6 PG (ref 27–33)
MCHC RBC AUTO-ENTMCNC: 33.1 G/DL (ref 32.3–36.5)
MCV RBC AUTO: 92.4 FL (ref 81.4–97.8)
MONOCYTES # BLD AUTO: 0.44 K/UL (ref 0–0.85)
MONOCYTES NFR BLD AUTO: 6.7 % (ref 0–13.4)
NEUTROPHILS # BLD AUTO: 3.85 K/UL (ref 1.82–7.42)
NEUTROPHILS NFR BLD: 58.2 % (ref 44–72)
NRBC # BLD AUTO: 0 K/UL
NRBC BLD-RTO: 0 /100 WBC (ref 0–0.2)
NT-PROBNP SERPL IA-MCNC: 74 PG/ML (ref 0–125)
PLATELET # BLD AUTO: 364 K/UL (ref 164–446)
PMV BLD AUTO: 9.7 FL (ref 9–12.9)
POTASSIUM SERPL-SCNC: 4.8 MMOL/L (ref 3.6–5.5)
PROT SERPL-MCNC: 7.4 G/DL (ref 6–8.2)
PSA SERPL DL<=0.01 NG/ML-MCNC: 10.3 NG/ML (ref 0–4)
RBC # BLD AUTO: 4.74 M/UL (ref 4.7–6.1)
SODIUM SERPL-SCNC: 134 MMOL/L (ref 135–145)
TRIGL SERPL-MCNC: 85 MG/DL (ref 0–149)
TSH SERPL DL<=0.005 MIU/L-ACNC: 1.95 UIU/ML (ref 0.38–5.33)
WBC # BLD AUTO: 6.6 K/UL (ref 4.8–10.8)

## 2024-12-24 PROCEDURE — 83880 ASSAY OF NATRIURETIC PEPTIDE: CPT

## 2024-12-24 PROCEDURE — 36415 COLL VENOUS BLD VENIPUNCTURE: CPT

## 2024-12-24 PROCEDURE — 85025 COMPLETE CBC W/AUTO DIFF WBC: CPT

## 2024-12-24 PROCEDURE — 80053 COMPREHEN METABOLIC PANEL: CPT

## 2024-12-24 PROCEDURE — 84153 ASSAY OF PSA TOTAL: CPT

## 2024-12-24 PROCEDURE — 84443 ASSAY THYROID STIM HORMONE: CPT

## 2024-12-24 PROCEDURE — 83036 HEMOGLOBIN GLYCOSYLATED A1C: CPT

## 2024-12-24 PROCEDURE — 80061 LIPID PANEL: CPT

## 2025-03-04 ENCOUNTER — APPOINTMENT (OUTPATIENT)
Dept: URBAN - METROPOLITAN AREA CLINIC 4 | Facility: CLINIC | Age: 60
Setting detail: DERMATOLOGY
End: 2025-03-04

## 2025-03-04 DIAGNOSIS — D18.0 HEMANGIOMA: ICD-10-CM

## 2025-03-04 DIAGNOSIS — L57.0 ACTINIC KERATOSIS: ICD-10-CM

## 2025-03-04 DIAGNOSIS — D22 MELANOCYTIC NEVI: ICD-10-CM

## 2025-03-04 DIAGNOSIS — Z85.828 PERSONAL HISTORY OF OTHER MALIGNANT NEOPLASM OF SKIN: ICD-10-CM

## 2025-03-04 DIAGNOSIS — D485 NEOPLASM OF UNCERTAIN BEHAVIOR OF SKIN: ICD-10-CM

## 2025-03-04 DIAGNOSIS — Z71.89 OTHER SPECIFIED COUNSELING: ICD-10-CM

## 2025-03-04 DIAGNOSIS — L81.4 OTHER MELANIN HYPERPIGMENTATION: ICD-10-CM

## 2025-03-04 DIAGNOSIS — Z85.820 PERSONAL HISTORY OF MALIGNANT MELANOMA OF SKIN: ICD-10-CM

## 2025-03-04 DIAGNOSIS — L82.1 OTHER SEBORRHEIC KERATOSIS: ICD-10-CM

## 2025-03-04 PROBLEM — D48.5 NEOPLASM OF UNCERTAIN BEHAVIOR OF SKIN: Status: ACTIVE | Noted: 2025-03-04

## 2025-03-04 PROBLEM — D18.01 HEMANGIOMA OF SKIN AND SUBCUTANEOUS TISSUE: Status: ACTIVE | Noted: 2025-03-04

## 2025-03-04 PROBLEM — D22.5 MELANOCYTIC NEVI OF TRUNK: Status: ACTIVE | Noted: 2025-03-04

## 2025-03-04 PROCEDURE — ? SUNSCREEN RECOMMENDATIONS

## 2025-03-04 PROCEDURE — ? BIOPSY BY SHAVE METHOD

## 2025-03-04 PROCEDURE — 11102 TANGNTL BX SKIN SINGLE LES: CPT

## 2025-03-04 PROCEDURE — ? LIQUID NITROGEN

## 2025-03-04 PROCEDURE — 99213 OFFICE O/P EST LOW 20 MIN: CPT | Mod: 25

## 2025-03-04 PROCEDURE — ? COUNSELING

## 2025-03-04 PROCEDURE — 17000 DESTRUCT PREMALG LESION: CPT | Mod: 59

## 2025-03-04 PROCEDURE — 17003 DESTRUCT PREMALG LES 2-14: CPT

## 2025-03-04 ASSESSMENT — LOCATION SIMPLE DESCRIPTION DERM
LOCATION SIMPLE: LEFT PRETIBIAL REGION
LOCATION SIMPLE: RIGHT UPPER BACK
LOCATION SIMPLE: LEFT UPPER ARM
LOCATION SIMPLE: RIGHT HAND
LOCATION SIMPLE: LEFT CHEEK
LOCATION SIMPLE: LEFT SHOULDER
LOCATION SIMPLE: LEFT FOREHEAD
LOCATION SIMPLE: LEFT FOREARM
LOCATION SIMPLE: UPPER BACK
LOCATION SIMPLE: LEFT UPPER BACK

## 2025-03-04 ASSESSMENT — LOCATION DETAILED DESCRIPTION DERM
LOCATION DETAILED: LEFT MEDIAL MALAR CHEEK
LOCATION DETAILED: LEFT FOREHEAD
LOCATION DETAILED: LEFT SUPERIOR LATERAL MALAR CHEEK
LOCATION DETAILED: LEFT DISTAL DORSAL FOREARM
LOCATION DETAILED: INFERIOR THORACIC SPINE
LOCATION DETAILED: LEFT ANTERIOR SHOULDER
LOCATION DETAILED: LEFT POSTERIOR SHOULDER
LOCATION DETAILED: LEFT LATERAL PROXIMAL PRETIBIAL REGION
LOCATION DETAILED: RIGHT SUPERIOR UPPER BACK
LOCATION DETAILED: LEFT PROXIMAL POSTERIOR UPPER ARM
LOCATION DETAILED: LEFT SUPERIOR MEDIAL UPPER BACK
LOCATION DETAILED: RIGHT RADIAL DORSAL HAND

## 2025-03-04 ASSESSMENT — LOCATION ZONE DERM
LOCATION ZONE: HAND
LOCATION ZONE: ARM
LOCATION ZONE: LEG
LOCATION ZONE: FACE
LOCATION ZONE: TRUNK

## 2025-03-04 NOTE — PROCEDURE: BIOPSY BY SHAVE METHOD
Detail Level: Detailed
Depth Of Biopsy: dermis
Was A Bandage Applied: Yes
Size Of Lesion In Cm: 0.5
X Size Of Lesion In Cm: 0
Biopsy Type: H and E
Biopsy Method: Dermablade
Anesthesia Type: 1% lidocaine without epinephrine
Hemostasis: Drysol
Wound Care: Petrolatum
Dressing: bandage
Destruction After The Procedure: No
Type Of Destruction Used: Curettage
Curettage Text: The wound bed was treated with curettage after the biopsy was performed.
Cryotherapy Text: The wound bed was treated with cryotherapy after the biopsy was performed.
Electrodesiccation Text: The wound bed was treated with electrodesiccation after the biopsy was performed.
Electrodesiccation And Curettage Text: The wound bed was treated with electrodesiccation and curettage after the biopsy was performed.
Silver Nitrate Text: The wound bed was treated with silver nitrate after the biopsy was performed.
Lab: [id number]
Lab Facility: 
Medical Necessity Information: It is in your best interest to select a reason for this procedure from the list below. All of these items fulfill various CMS LCD requirements except the new and changing color options.
Consent: Written consent was obtained and risks were reviewed including but not limited to scarring, infection, bleeding, scabbing, incomplete removal, nerve damage and allergy to anesthesia.
Post-Care Instructions: I reviewed with the patient in detail post-care instructions. Patient is to keep the biopsy site dry overnight, and then apply bacitracin twice daily until healed. Patient may apply hydrogen peroxide soaks to remove any crusting.
Notification Instructions: Patient will be notified of biopsy results. However, patient instructed to call the office if not contacted within 2 weeks.
Billing Type: Third-Party Bill
Information: Selecting Yes will display possible errors in your note based on the variables you have selected. This validation is only offered as a suggestion for you. PLEASE NOTE THAT THE VALIDATION TEXT WILL BE REMOVED WHEN YOU FINALIZE YOUR NOTE. IF YOU WANT TO FAX A PRELIMINARY NOTE YOU WILL NEED TO TOGGLE THIS TO 'NO' IF YOU DO NOT WANT IT IN YOUR FAXED NOTE.

## 2025-03-04 NOTE — PROCEDURE: COUNSELING
Sunscreen Recommendations: SPF 30 or greater.
Detail Level: Generalized
Detail Level: Detailed
When Should The Patient Follow-Up For Their Next Full-Body Skin Exam?: 6 Months
Quality 137: Melanoma: Continuity Of Care - Recall System: Patient information entered into a recall system that includes: target date for the next exam specified AND a process to follow up with patients regarding missed or unscheduled appointments

## 2025-03-04 NOTE — PROCEDURE: LIQUID NITROGEN
future needs. Plan for follow-up call in 7-10 days based on severity of symptoms and risk factors. Plan for next call: symptom management-nausea, anxiety      Addressed changes since last contact:  none  Discussed follow-up appointments. If no appointment was previously scheduled, appointment scheduling offered: No.   Is follow up appointment scheduled within 7 days of discharge? Yes. Follow Up  Future Appointments   Date Time Provider [address]   8/23/2023 11:00 AM [facility]     Non-BS follow up appointment(s): None    Care Transition Nurse reviewed discharge instructions with patient and discussed any barriers to care and/or understanding of plan of care after discharge. Discussed appropriate site of care based on symptoms and resources available to patient including: PCP. The patient agrees to contact the PCP office for questions related to their healthcare. Advance Care Planning:   reviewed and current.      Patients top risk factors for readmission: depression     Care Transitions Subsequent and Final Call    Subsequent and Final Calls  Care Transitions Interventions  Other Interventions:             Teofilo Simmonds, RN
Detail Level: Detailed
Show Aperture Variable?: Yes
Consent: The patient's consent was obtained including but not limited to risks of crusting, scabbing, blistering, scarring, darker or lighter pigmentary change, recurrence, incomplete removal and infection.
Render Post-Care Instructions In Note?: no
Application Tool (Optional): Liquid Nitrogen Sprayer
Duration Of Freeze Thaw-Cycle (Seconds): 4
Post-Care Instructions: I reviewed with the patient in detail post-care instructions. Patient is to wear sunprotection, and avoid picking at any of the treated lesions. Pt may apply Vaseline to crusted or scabbing areas.
Number Of Freeze-Thaw Cycles: 1 freeze-thaw cycle

## 2025-04-12 ENCOUNTER — HOSPITAL ENCOUNTER (OUTPATIENT)
Dept: LAB | Facility: MEDICAL CENTER | Age: 60
End: 2025-04-12
Attending: NEUROLOGICAL SURGERY
Payer: COMMERCIAL

## 2025-04-12 LAB — PSA SERPL DL<=0.01 NG/ML-MCNC: 4.07 NG/ML (ref 0–4)

## 2025-04-12 PROCEDURE — 36415 COLL VENOUS BLD VENIPUNCTURE: CPT

## 2025-04-12 PROCEDURE — 84153 ASSAY OF PSA TOTAL: CPT

## 2025-04-24 NOTE — Clinical Note
Member Name: Jayjay Anders   Member Number: [id number]   Reference Number: [id number]   Approved Services: Echos and EKG   Approved Service Dates: 03/10/2025 - 06/08/2025   Requesting Provider: Naa Milner   Requested Provider: [facility]     Dear Jayjay Gallo Anders:     The following medical service(s) requested by Naa Milner have been approved:    Procedure Code Procedure Code Name Requested Quantity Approved Quantity Status   15566 (CPT®) RI ECHO HEART XTHORACIC,COMPLETE W DOPPLER 1 1 Authorized       Approved Quantity means the number of visits approved for medication treatments and/or medical services.    The services should be provided by [facility] no later than 06/08/2025. Please contact the provider listed below with any questions.     Provider Information:  [facility]  [phone]    Your plan benefit may require a deductible, co-payment or coinsurance for these services. This authorization does not guarantee [facility] will pay the claim for services that you receive. Payment by [facility] for these services is subject to the terms of your Evidence of Coverage or Summary Plan Description, your eligibility at the time of service, and confirmation of benefit coverage.    For any questions or additional information, please contact Customer Service:    [facility]  Customer Service: [phone] or toll free [phone]  TTY users dial: [phone]   Call Center Hours: Mon - Fri 7 AM to 8 PM PST   Office Hours: Mon - Fri 8 AM to 5 PM [facility]   E-mail: [email or web address]   Website: [email or web address]       This information is available for free in other languages. Please contact Customer Service at the phone number above for more information. [facility] complies with applicable Federal civil rights laws and does not discriminate on the basis of race, color, national origin, age, disability  or sex.      Sincerely,     Healthcare Utilization Management Department     Cc: [facility]   Naa Milner

## 2025-05-01 ENCOUNTER — HOSPITAL ENCOUNTER (OUTPATIENT)
Dept: CARDIOLOGY | Facility: MEDICAL CENTER | Age: 60
End: 2025-05-01
Attending: NURSE PRACTITIONER
Payer: COMMERCIAL

## 2025-05-01 ENCOUNTER — RESULTS FOLLOW-UP (OUTPATIENT)
Dept: CARDIOLOGY | Facility: MEDICAL CENTER | Age: 60
End: 2025-05-01

## 2025-05-01 DIAGNOSIS — Q21.12 PFO WITH ATRIAL SEPTAL ANEURYSM: ICD-10-CM

## 2025-05-01 DIAGNOSIS — I25.3 PFO WITH ATRIAL SEPTAL ANEURYSM: ICD-10-CM

## 2025-05-01 LAB
LV EJECT FRACT  99904: 61
LV EJECT FRACT MOD 2C 99903: 64.89
LV EJECT FRACT MOD 4C 99902: 56.93
LV EJECT FRACT MOD BP 99901: 61.01

## 2025-05-01 PROCEDURE — 93306 TTE W/DOPPLER COMPLETE: CPT | Mod: 26 | Performed by: INTERNAL MEDICINE

## 2025-05-01 PROCEDURE — 93306 TTE W/DOPPLER COMPLETE: CPT

## 2025-06-10 ENCOUNTER — APPOINTMENT (OUTPATIENT)
Dept: URBAN - METROPOLITAN AREA CLINIC 4 | Facility: CLINIC | Age: 60
Setting detail: DERMATOLOGY
End: 2025-06-10

## 2025-06-10 DIAGNOSIS — L82.1 OTHER SEBORRHEIC KERATOSIS: ICD-10-CM

## 2025-06-10 PROCEDURE — ? COUNSELING

## 2025-06-10 PROCEDURE — ? ADDITIONAL NOTES

## 2025-06-10 ASSESSMENT — LOCATION ZONE DERM: LOCATION ZONE: TRUNK

## 2025-06-10 ASSESSMENT — LOCATION DETAILED DESCRIPTION DERM: LOCATION DETAILED: STERNUM

## 2025-06-10 ASSESSMENT — LOCATION SIMPLE DESCRIPTION DERM: LOCATION SIMPLE: CHEST

## (undated) DEVICE — SUTURE 3-0 ETHILON PS-1 (36PK/BX)

## (undated) DEVICE — SUTURE 3-0 VICRYL PLUS SH - 27 INCH (36/BX)

## (undated) DEVICE — LACTATED RINGERS INJ 1000 ML - (14EA/CA 60CA/PF)

## (undated) DEVICE — CANISTER SUCTION 3000ML MECHANICAL FILTER AUTO SHUTOFF MEDI-VAC NONSTERILE LF DISP  (40EA/CA)

## (undated) DEVICE — SYRINGE 20 ML LL (50EA/BX 4BX/CA)

## (undated) DEVICE — SLEEVE, VASO, REPROC, LARGE

## (undated) DEVICE — DRAPE IOBAN II INCISE 23X17 - (10EA/BX 4BX/CA)

## (undated) DEVICE — TUBING CLEARLINK DUO-VENT - C-FLO (48EA/CA)

## (undated) DEVICE — GLOVE BIOGEL PI ORTHO SZ 6 SURGICAL PF LF (40PR/BX)

## (undated) DEVICE — KIT  I.V. START (100EA/CA)

## (undated) DEVICE — DRAPE SURGICAL U 77X120 - (10/CA)

## (undated) DEVICE — MEDICINE CUP STERILE 2 OZ - (100/CA)

## (undated) DEVICE — SUTURE 0 ETHIBOND CT-1 - (12/BX) 18 INCH

## (undated) DEVICE — SUTURE 4-0 MONOCRYL PLUS PS-2 - 27 INCH (36/BX)

## (undated) DEVICE — SUTURE 2-0 VICRYL PLUS CT-1 36 (36PK/BX)"

## (undated) DEVICE — SUCTION INSTRUMENT YANKAUER BULBOUS TIP W/O VENT (50EA/CA)

## (undated) DEVICE — GLOVE BIOGEL INDICATOR SZ 7.5 SURGICAL PF LTX - (50PR/BX 4BX/CA)

## (undated) DEVICE — PAD PREP 24 X 48 CUFFED - (100/CA)

## (undated) DEVICE — CHLORAPREP 26 ML APPLICATOR - ORANGE TINT(25/CA)

## (undated) DEVICE — ELECTRODE DUAL RETURN W/ CORD - (50/PK)

## (undated) DEVICE — CATHETER EMBOLECTOMY 3FR (1EA)

## (undated) DEVICE — GLOVE BIOGEL PI ORTHO SZ 7.5 PF LF (40PR/BX)

## (undated) DEVICE — BANDAGE ELASTIC 6 HONEYCOMB - 6X5YD LF (20/CA)"

## (undated) DEVICE — PACK LOWER EXTREMITY - (2/CA)

## (undated) DEVICE — ELECTRODE NEEDLE NON-SAFETY 2.8 IN (150EA/CT)

## (undated) DEVICE — GLOVE SZ 7.5 BIOGEL PI MICRO - PF LF (50PR/BX)

## (undated) DEVICE — SUTURE 3-0 SILK 12 X 18 IN - (36/BX)

## (undated) DEVICE — TUBE E-T HI-LO CUFF 7.5MM (10EA/PK)

## (undated) DEVICE — SET EXTENSION WITH 2 PORTS (48EA/CA) ***PART #2C8610 IS A SUBSTITUTE*****

## (undated) DEVICE — PACK MINOR BASIN - (2EA/CA)

## (undated) DEVICE — SODIUM CHL IRRIGATION 0.9% 1000ML (12EA/CA)

## (undated) DEVICE — GLOVE BIOGEL SZ 6.5 SURGICAL PF LTX (50PR/BX 4BX/CA)

## (undated) DEVICE — BLANKET WARMING LOWER BODY (10EA/CA)

## (undated) DEVICE — DRAPE LARGE 3 QUARTER - (20/CA)

## (undated) DEVICE — SYRINGE 10 ML CONTROL LL (25EA/BX 4BX/CA)

## (undated) DEVICE — CONTAINER SPECIMEN BAG OR - STERILE 4 OZ W/LID (100EA/CA)

## (undated) DEVICE — SENSOR OXIMETER ADULT SPO2 RD SET (20EA/BX)

## (undated) DEVICE — SODIUM CHL. INJ. 0.9% 500ML (24EA/CA 50CA/PF)

## (undated) DEVICE — SET LEADWIRE 5 LEAD BEDSIDE DISPOSABLE ECG (1SET OF 5/EA)

## (undated) DEVICE — SUTURE GENERAL

## (undated) DEVICE — VESSELOOP MAXI BLUE STERILE- SURG-I-LOOP (10EA/BX)

## (undated) DEVICE — BOVIE NEEDLE TIP INSULATD NON-SAFETY 2CM (50/PK)

## (undated) DEVICE — DRESSING AQUACEL AG ADVANTAGE 3.5 X 10" (10EA/BX)"

## (undated) DEVICE — GOWN SURGEONS X-LARGE - DISP. (30/CA)

## (undated) DEVICE — DRAPE C ARMOR (12EA/CA)

## (undated) DEVICE — GLOVE BIOGEL SZ 7 SURGICAL PF LTX - (50PR/BX 4BX/CA)

## (undated) DEVICE — BOVIE BLADE COATED &INSULATED - 25/PK

## (undated) DEVICE — SPONGE GAUZESTER 4 X 4 4PLY - (128PK/CA)

## (undated) DEVICE — TOWEL STOP TIMEOUT SAFETY FLAG (40EA/CA)

## (undated) DEVICE — BLADE SURGICAL #11 - (50/BX)

## (undated) DEVICE — PADDING CAST 6 IN STERILE - 6 X 4 YDS (24/CA)

## (undated) DEVICE — SUTURE RETRIEVER HEWSON LIGA - 6/BX

## (undated) DEVICE — SET INTRO MIRCROPUNCTURE - MPIS-501-SST

## (undated) DEVICE — TOWELS CLOTH SURGICAL - (4/PK 20PK/CA)

## (undated) DEVICE — DRAPE LAPAROTOMY T SHEET - (12EA/CA)

## (undated) DEVICE — GOWN WARMING X-LARGE FLEX - (20/CA)

## (undated) DEVICE — COVER LIGHT HANDLE ALC PLUS DISP (18EA/BX)

## (undated) DEVICE — HEMOSTAT SURG ABSORBABLE - 4 X 8 IN SURGICEL (24EA/CA)

## (undated) DEVICE — SLEEVE, VASO, THIGH, MED

## (undated) DEVICE — GOWN WARMING STANDARD FLEX - (30/CA)

## (undated) DEVICE — SUTURE CV

## (undated) DEVICE — ADHESIVE MASTISOL - (48/BX)

## (undated) DEVICE — SYRINGE 30 ML LL (56/BX)

## (undated) DEVICE — Device

## (undated) DEVICE — BANDAGE ROLL STERILE BULKEE 4.5 IN X 4 YD (100EA/CA)

## (undated) DEVICE — PACK AV FISTULA (4EA/CA)

## (undated) DEVICE — CATHETER IV 20 GA X 1-1/4 ---SURG.& SDS ONLY--- (50EA/BX)

## (undated) DEVICE — SUTURE 6-0 PROLENE BV-1 D/A 24 (36PK/BX)"

## (undated) DEVICE — STAPLER SKIN DISP - (6/BX 10BX/CA) VISISTAT

## (undated) DEVICE — DRAPE SURG STERI-DRAPE 7X11OD - (40EA/CA)

## (undated) DEVICE — SUTURE 7-0 PROLENE BV-1 D/A 24 (36PK/BX)"

## (undated) DEVICE — MASK  AIRWAY SIZE 5 UNIQUE SILICON (10EA/BX)

## (undated) DEVICE — BLADE SURGICAL #10 - (50/BX)

## (undated) DEVICE — SUTURE 3-0 VICRYL PLUS SH - 8X 18 INCH (12/BX)

## (undated) DEVICE — CATHETER EMBOLECTOMY 2FR (5EA/CA)

## (undated) DEVICE — INSTRUMENT JAWCOVER SUTURE - BOOTS (5PK/BX)

## (undated) DEVICE — DECANTER FLD BLS - (50/CA)

## (undated) DEVICE — WATER IRRIGATION STERILE 1000ML (12EA/CA)

## (undated) DEVICE — STOPCOCK MALE 4-WAY - (50/CA)

## (undated) DEVICE — DRAPESURG STERI-DRAPE LONG - (10/BX 4BX/CA)

## (undated) DEVICE — DRESSING TRANSPARENT FILM TEGADERM 4 X 4.75" (50EA/BX)"

## (undated) DEVICE — GLOVE BIOGEL PI INDICATOR SZ 8.0 SURGICAL PF LF -(50/BX 4BX/CA)

## (undated) DEVICE — CLOSURE SKIN STRIP 1/2 X 4 IN - (STERI STRIP) (50/BX 4BX/CA)

## (undated) DEVICE — DISH PETRI STERILE (50EA/CA)

## (undated) DEVICE — BOVIE BLADE COATED - (50/PK)

## (undated) DEVICE — GUIDE WIRE 1.25 X 150MM SMTH - (4CSX6=24)

## (undated) DEVICE — SET EXTENSION 31IN APPX 3.2ML WITH CLAMP (50/CA)WAS 4610-03

## (undated) DEVICE — DRAPE 36X28IN RAD CARM BND BG - (25/CA) O

## (undated) DEVICE — SUTURE 0 VICRYL PLUS CT-1 - 36 INCH (36/BX)

## (undated) DEVICE — SLEEVE VASO CALF MED - (10PR/CA)

## (undated) DEVICE — CANNULA O2 COMFORT SOFT EAR ADULT 7 FT TUBING (50/CA)

## (undated) DEVICE — TUBING C&T SET FLYING LEADS DRAIN TUBING (10EA/BX)